# Patient Record
Sex: FEMALE | Race: WHITE | NOT HISPANIC OR LATINO | Employment: UNEMPLOYED | URBAN - NONMETROPOLITAN AREA
[De-identification: names, ages, dates, MRNs, and addresses within clinical notes are randomized per-mention and may not be internally consistent; named-entity substitution may affect disease eponyms.]

---
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
## 2019-01-02 PROBLEM — O99.333 PREGNANCY COMPLICATED BY TOBACCO USE IN THIRD TRIMESTER: Status: ACTIVE | Noted: 2019-01-02

## 2019-01-02 PROBLEM — O09.33 PRENATAL CARE INSUFFICIENT, THIRD TRIMESTER: Status: ACTIVE | Noted: 2019-01-02

## 2019-01-02 NOTE — PROGRESS NOTES
CC: NELIDA visit    HPI: No c/o.  Getting Hgb A1C drawn today since has been unable to do 1hr glucose+FM  Denies any VB or cramping     PE - see vitals  Anatomy scan performed today of female fetus.  Anterior placenta,  3VC noted, ELMER 15.4, CL 4.63cm     A/P: 19 yo  @ 30w1d by 23w luis presents for NELIDA visit.   1. Continue routine prenatal care  - Encourage PNV  - PTL warnings reviewed  - B+, RI, HIV neg, RPR NR, HBsAg neg, GC/CT neg/neg;   - RTC in 2 weeks NELIDA with MD    2. Late Prenatal Care  - IG US Q4w (42% today)    3. Tobacco use    Karuna MENA CNM

## 2019-01-04 NOTE — PROGRESS NOTES
CC: NELIDA visit    HPI: No c/o.  +FM  Denies any VB or cramping     PE - see vitals    A/P: 17 yo  @ 32w2d by 23w sonchristina presents for NELIDA visit.   1. Continue routine prenatal care  - Encourage PNV  - PTL warnings reviewed  - B+, RI, HIV neg, RPR NR, HBsAg neg, GC/CT neg/neg; HgbAIC wnl  - RTC in 2 weeks NELIDA with MD and growth scan    2. Late Prenatal Care  - IG US Q4w (42% today)    3. Tobacco use    4. Anemia   - Ferrous sulfate TID    Karuna MENA CNM

## 2019-01-07 ENCOUNTER — ROUTINE PRENATAL (OUTPATIENT)
Dept: OBSTETRICS AND GYNECOLOGY | Facility: CLINIC | Age: 19
End: 2019-01-07

## 2019-01-07 VITALS — DIASTOLIC BLOOD PRESSURE: 74 MMHG | BODY MASS INDEX: 23.23 KG/M2 | WEIGHT: 127 LBS | SYSTOLIC BLOOD PRESSURE: 107 MMHG

## 2019-01-07 DIAGNOSIS — Z3A.34 34 WEEKS GESTATION OF PREGNANCY: Primary | ICD-10-CM

## 2019-01-07 DIAGNOSIS — O09.33 PRENATAL CARE INSUFFICIENT, THIRD TRIMESTER: ICD-10-CM

## 2019-01-07 PROCEDURE — 99213 OFFICE O/P EST LOW 20 MIN: CPT | Performed by: NURSE PRACTITIONER

## 2019-01-07 RX ORDER — OMEPRAZOLE 20 MG/1
20 CAPSULE, DELAYED RELEASE ORAL DAILY
Qty: 30 CAPSULE | Refills: 1 | Status: SHIPPED | OUTPATIENT
Start: 2019-01-07 | End: 2019-12-14

## 2019-01-07 RX ORDER — PRENATAL VIT/IRON FUM/FOLIC AC 65 MG-1 MG
1 TABLET ORAL DAILY
Qty: 30 EACH | Refills: 12 | Status: SHIPPED | OUTPATIENT
Start: 2019-01-07 | End: 2019-12-14

## 2019-01-07 NOTE — PROGRESS NOTES
CC: NELIDA visit    HPI: No c/o.  +FM  Denies any VB or cramping     PE - see vitals  Growth US 33% 5lb 4oz     A/P: 17 yo  @ 34w6d by 23w luis presents for NELIDA visit.   1. Continue routine prenatal care  - Encourage PNV  - PTL warnings reviewed  - B+, RI, HIV neg, RPR NR, HBsAg neg, GC/CT neg/neg;   - RTC in 2 weeks NELIDA with MD    2. Late Prenatal Care  - IG US Q4w     3. Tobacco use    Karuna MENA CNM

## 2019-01-15 ENCOUNTER — ROUTINE PRENATAL (OUTPATIENT)
Dept: OBSTETRICS AND GYNECOLOGY | Facility: CLINIC | Age: 19
End: 2019-01-15

## 2019-01-15 VITALS — DIASTOLIC BLOOD PRESSURE: 73 MMHG | BODY MASS INDEX: 23.59 KG/M2 | SYSTOLIC BLOOD PRESSURE: 106 MMHG | WEIGHT: 129 LBS

## 2019-01-15 DIAGNOSIS — O09.33 PRENATAL CARE INSUFFICIENT, THIRD TRIMESTER: ICD-10-CM

## 2019-01-15 DIAGNOSIS — O99.333 PREGNANCY COMPLICATED BY TOBACCO USE IN THIRD TRIMESTER: ICD-10-CM

## 2019-01-15 DIAGNOSIS — Z3A.36 36 WEEKS GESTATION OF PREGNANCY: ICD-10-CM

## 2019-01-15 DIAGNOSIS — Z36.85 ANTENATAL SCREENING FOR STREPTOCOCCUS B: Primary | ICD-10-CM

## 2019-01-15 PROCEDURE — 99213 OFFICE O/P EST LOW 20 MIN: CPT | Performed by: NURSE PRACTITIONER

## 2019-01-15 PROCEDURE — 87653 STREP B DNA AMP PROBE: CPT | Performed by: NURSE PRACTITIONER

## 2019-01-15 RX ORDER — HYDROXYZINE PAMOATE 25 MG/1
25 CAPSULE ORAL 3 TIMES DAILY PRN
Qty: 30 CAPSULE | Refills: 0 | Status: SHIPPED | OUTPATIENT
Start: 2019-01-15 | End: 2019-12-14

## 2019-01-15 NOTE — PROGRESS NOTES
CC: NELIDA visit     HPI: Contractions every 20-30 for last 2 days.  +FM  Denies any VB or cramping     PE - see vitals  Growth US 33% 5lb 4oz      A/P: 19 yo  @ 36w0d by 23w luis presents for NELIDA visit.   1. Continue routine prenatal care  - Encourage PNV  - PTL warnings reviewed  - B+, RI, HIV neg, RPR NR, HBsAg neg, GC/CT neg/neg; GBS  - RTC in 1 weeks NELIDA      2. Late Prenatal Care  - IG US Q4w      3. Tobacco use     Karuna MENA CNM

## 2019-01-16 LAB — GROUP B STREP, DNA: NEGATIVE

## 2019-01-18 ENCOUNTER — HOSPITAL ENCOUNTER (OUTPATIENT)
Facility: HOSPITAL | Age: 19
Discharge: HOME | End: 2019-01-18
Attending: OBSTETRICS & GYNECOLOGY | Admitting: OBSTETRICS & GYNECOLOGY

## 2019-01-18 VITALS
HEART RATE: 114 BPM | RESPIRATION RATE: 18 BRPM | SYSTOLIC BLOOD PRESSURE: 134 MMHG | OXYGEN SATURATION: 98 % | TEMPERATURE: 98.4 F | DIASTOLIC BLOOD PRESSURE: 74 MMHG

## 2019-01-18 LAB — A1 MICROGLOB PLACENTAL VAG QL: NEGATIVE

## 2019-01-18 PROCEDURE — 59025 FETAL NON-STRESS TEST: CPT

## 2019-01-18 PROCEDURE — G0463 HOSPITAL OUTPT CLINIC VISIT: HCPCS

## 2019-01-18 PROCEDURE — 25010000003 HYDROXYZINE PER 25 MG: Performed by: OBSTETRICS & GYNECOLOGY

## 2019-01-18 PROCEDURE — 84112 EVAL AMNIOTIC FLUID PROTEIN: CPT | Performed by: OBSTETRICS & GYNECOLOGY

## 2019-01-18 PROCEDURE — 96372 THER/PROPH/DIAG INJ SC/IM: CPT

## 2019-01-18 RX ORDER — HYDROXYZINE HYDROCHLORIDE 25 MG/ML
100 INJECTION, SOLUTION INTRAMUSCULAR ONCE
Status: COMPLETED | OUTPATIENT
Start: 2019-01-18 | End: 2019-01-18

## 2019-01-18 RX ADMIN — HYDROXYZINE HYDROCHLORIDE 100 MG: 25 INJECTION, SOLUTION INTRAMUSCULAR at 07:29

## 2019-01-18 NOTE — NURSING NOTE
RN at bedside with discharge paperwork. Pt given clinical references on [place] [facility] Contractions and Fetal Kick Counts. Discharge instructions reviewed with pt as given by Dr. Tinoco. RN verified that the patients friend, whom is at bedside, would drive pt home d/t the IM Vistaril.  Pt instructed not to drive herself. Pt and friend verbalized understanding and agreement. Pt denies questions.     0825 pt ambulated to vehicle with her friend for discharge.

## 2019-01-18 NOTE — NURSING NOTE
"Dr. Tinoco notified by phone of pt request to be discharged. Pt states that contractions are still present and she is aware of them but they  \"don't hurt like before\". Pt has stated 3 times since medication given \"I just want to go home, when am I going to be able to\". Pt VAS 1 35 minutes after IM Vistaril given.     Order to discharge pt home. Pt must have transportation, she is not allowed to drive herself d/t the Vistaril. Give pt instructions to return to unit if she has bloody show regardless if strong contractions are present or not. Give pt other routine instructions for returning to unit.   "

## 2019-01-18 NOTE — NON STRESS TEST
Shanika Hudson, a  at 36w3d with an HORTENCIA of 2019, by Ultrasound, was seen at [facility]OR DELIVERY for a nonstress test.    Chief Complaint   Patient presents with   • Contractions     Contractions since tuesday, worse today   • Leaking Fluid     feels like her water is leaking       Patient Active Problem List   Diagnosis   • Pregnancy complicated by tobacco use in third trimester   • Prenatal care insufficient, third trimester       Start Time: 0400  Stop Time: 07    Interpretation A  Nonstress Test Interpretation A: Reactive (19 07 : Millie Thomas, RN)  Comments A: reviewed w/ Kristina RN (19 : Millie Thomas, RN)    Amniswab negative. Ferning negative. Amnisure negative. No fluid noted on exam. sve 2/50/0. No bloody show noted. uc's remain irregular. IM VIstaril 100mg given w/ some relief. Pt request discharge.

## 2019-01-18 NOTE — DISCHARGE INSTRUCTIONS
Please return for contractions that are regular and increasing in intensity, bloody show with or without strong contractions, leaking of fluid, decreased fetal movement, bright red vaginal bleeding or any other concerns. May call on call provider or office during office hours for any questions or concerns on whether to return to unit.

## 2019-01-22 ENCOUNTER — ROUTINE PRENATAL (OUTPATIENT)
Dept: OBSTETRICS AND GYNECOLOGY | Facility: CLINIC | Age: 19
End: 2019-01-22

## 2019-01-22 VITALS — WEIGHT: 130 LBS | SYSTOLIC BLOOD PRESSURE: 107 MMHG | DIASTOLIC BLOOD PRESSURE: 75 MMHG | BODY MASS INDEX: 23.78 KG/M2

## 2019-01-22 DIAGNOSIS — O09.33 PRENATAL CARE INSUFFICIENT, THIRD TRIMESTER: Primary | ICD-10-CM

## 2019-01-22 DIAGNOSIS — O99.333 TOBACCO USE IN PREGNANCY, ANTEPARTUM, THIRD TRIMESTER: ICD-10-CM

## 2019-01-22 DIAGNOSIS — Z3A.37 37 WEEKS GESTATION OF PREGNANCY: ICD-10-CM

## 2019-01-22 PROCEDURE — 99213 OFFICE O/P EST LOW 20 MIN: CPT | Performed by: NURSE PRACTITIONER

## 2019-01-22 NOTE — PROGRESS NOTES
CC: NELIDA visit     HPI: Very uncomfortable.  Went to [facility] over the weekend with contractions 4min apart.  Has continued to contract since just not as strong or close together.   +FM  Denies any VB or LOF     PE - see vitals    A/P: 17 yo  @ 37w0d by 23w luis presents for NELIDA visit.   1. Continue routine prenatal care  - Encourage PNV  - Labor s/s reviewed  - B+, RI, HIV neg, RPR NR, HBsAg neg, GC/CT neg/neg; GBS  - RTC in 2 weeks NELIDA with MD     2. Late Prenatal Care  - IG US Q4w      3. Tobacco use     Karuna MENA CNM

## 2019-02-04 ENCOUNTER — HOSPITAL ENCOUNTER (OUTPATIENT)
Facility: HOSPITAL | Age: 19
Discharge: HOME | End: 2019-02-04
Attending: OBSTETRICS & GYNECOLOGY | Admitting: OBSTETRICS & GYNECOLOGY

## 2019-02-04 VITALS
TEMPERATURE: 98 F | BODY MASS INDEX: 23.04 KG/M2 | OXYGEN SATURATION: 97 % | SYSTOLIC BLOOD PRESSURE: 109 MMHG | HEART RATE: 110 BPM | HEIGHT: 63 IN | RESPIRATION RATE: 18 BRPM | DIASTOLIC BLOOD PRESSURE: 60 MMHG | WEIGHT: 130 LBS

## 2019-02-04 LAB
A1 MICROGLOB PLACENTAL VAG QL: NEGATIVE
CANDIDA ALBICANS: NEGATIVE
GARDNERELLA VAGINALIS: NEGATIVE
TRICHOMONAS VAGINALIS PCR: NEGATIVE

## 2019-02-04 PROCEDURE — 87510 GARDNER VAG DNA DIR PROBE: CPT | Performed by: OBSTETRICS & GYNECOLOGY

## 2019-02-04 PROCEDURE — 87480 CANDIDA DNA DIR PROBE: CPT | Performed by: OBSTETRICS & GYNECOLOGY

## 2019-02-04 PROCEDURE — G0463 HOSPITAL OUTPT CLINIC VISIT: HCPCS

## 2019-02-04 PROCEDURE — 87660 TRICHOMONAS VAGIN DIR PROBE: CPT | Performed by: OBSTETRICS & GYNECOLOGY

## 2019-02-04 PROCEDURE — 84112 EVAL AMNIOTIC FLUID PROTEIN: CPT | Performed by: OBSTETRICS & GYNECOLOGY

## 2019-02-04 PROCEDURE — 59025 FETAL NON-STRESS TEST: CPT

## 2019-02-05 ENCOUNTER — ROUTINE PRENATAL (OUTPATIENT)
Dept: OBSTETRICS AND GYNECOLOGY | Facility: CLINIC | Age: 19
End: 2019-02-05

## 2019-02-05 DIAGNOSIS — O99.333 TOBACCO SMOKING AFFECTING PREGNANCY IN THIRD TRIMESTER: ICD-10-CM

## 2019-02-05 DIAGNOSIS — O26.893 LOW BACK PAIN DURING PREGNANCY IN THIRD TRIMESTER: ICD-10-CM

## 2019-02-05 DIAGNOSIS — Z34.90 TERM PREGNANCY: Primary | ICD-10-CM

## 2019-02-05 DIAGNOSIS — Z3A.39 39 WEEKS GESTATION OF PREGNANCY: ICD-10-CM

## 2019-02-05 DIAGNOSIS — M54.50 LOW BACK PAIN DURING PREGNANCY IN THIRD TRIMESTER: ICD-10-CM

## 2019-02-05 PROCEDURE — 99213 OFFICE O/P EST LOW 20 MIN: CPT | Performed by: NURSE PRACTITIONER

## 2019-02-05 NOTE — NON STRESS TEST
"  Shanika Hudson, a  at 38w6d with an HORTENCIA of 2019, by Ultrasound, was seen at [facility] LABOR DELIVERY for a nonstress test.    Chief Complaint   Patient presents with   • Contractions     Pt to L&D with c/o contractions that have been going on for \"a long time\". Pt states they don't feel any different than they have before. Pt also reports having cramping that started 2 days ago, but states she has not spoken to anyone in the office about it. Pt reports + fetal movement. Pt reports leaking fluid \"for a while\". Pt denies any bleeding.        Patient Active Problem List   Diagnosis   • Pregnancy complicated by tobacco use in third trimester   • Prenatal care insufficient, third trimester       Start Time:   Stop Time:     Interpretation A  Nonstress Test Interpretation A: Reactive (19 2350 : Lexi Gutiérrez RN)  Comments A: Reviewed with ALEXANDRO Crain RN (19 2560 : Lexi Gutiérrez RN)      Vag panel negative  Amnisure negative    [facility]   "

## 2019-02-05 NOTE — NURSING NOTE
At bedside to go over negative lab results with pt. Told pt I would do cervical exam again to make sure she hadn't dilated anymore. Pt refused. Pt stated she has a dr appointment in the morning and she feels like she probably hasn't changed anyway.

## 2019-02-05 NOTE — DISCHARGE INSTRUCTIONS
Return to hospital for regular painful contractions that you are unable to walk or talk through, bleeding, decreased fetal movement, or water breaks.     Keep next scheduled appointment.     Call office or on call OB with any questions or concerns.

## 2019-02-12 ENCOUNTER — ROUTINE PRENATAL (OUTPATIENT)
Dept: OBSTETRICS AND GYNECOLOGY | Facility: CLINIC | Age: 19
End: 2019-02-12

## 2019-02-12 VITALS — SYSTOLIC BLOOD PRESSURE: 100 MMHG | WEIGHT: 134.8 LBS | BODY MASS INDEX: 23.88 KG/M2 | DIASTOLIC BLOOD PRESSURE: 60 MMHG

## 2019-02-12 DIAGNOSIS — Z3A.40 40 WEEKS GESTATION OF PREGNANCY: Primary | ICD-10-CM

## 2019-02-12 DIAGNOSIS — O99.333 PREGNANCY COMPLICATED BY TOBACCO USE IN THIRD TRIMESTER: ICD-10-CM

## 2019-02-12 DIAGNOSIS — O09.33 PRENATAL CARE INSUFFICIENT, THIRD TRIMESTER: ICD-10-CM

## 2019-02-12 PROCEDURE — 99213 OFFICE O/P EST LOW 20 MIN: CPT | Performed by: OBSTETRICS & GYNECOLOGY

## 2019-02-13 DIAGNOSIS — Z34.90 TERM PREGNANCY: ICD-10-CM

## 2019-02-13 RX ORDER — SODIUM CHLORIDE, SODIUM LACTATE, POTASSIUM CHLORIDE, CALCIUM CHLORIDE 600; 310; 30; 20 MG/100ML; MG/100ML; MG/100ML; MG/100ML
125 INJECTION, SOLUTION INTRAVENOUS CONTINUOUS
Status: CANCELLED | OUTPATIENT
Start: 2019-02-13

## 2019-02-13 RX ORDER — SODIUM CHLORIDE 0.9 % (FLUSH) 0.9 %
3-10 SYRINGE (ML) INJECTION AS NEEDED
Status: CANCELLED | OUTPATIENT
Start: 2019-02-13

## 2019-02-13 RX ORDER — CARBOPROST TROMETHAMINE 250 UG/ML
250 INJECTION, SOLUTION INTRAMUSCULAR AS NEEDED
Status: CANCELLED | OUTPATIENT
Start: 2019-02-13

## 2019-02-13 RX ORDER — SODIUM CHLORIDE 0.9 % (FLUSH) 0.9 %
3 SYRINGE (ML) INJECTION EVERY 12 HOURS SCHEDULED
Status: CANCELLED | OUTPATIENT
Start: 2019-02-13

## 2019-02-13 RX ORDER — METHYLERGONOVINE MALEATE 0.2 MG/ML
200 INJECTION INTRAVENOUS ONCE AS NEEDED
Status: CANCELLED | OUTPATIENT
Start: 2019-02-13

## 2019-02-13 RX ORDER — MISOPROSTOL 100 UG/1
800 TABLET ORAL AS NEEDED
Status: CANCELLED | OUTPATIENT
Start: 2019-02-13

## 2019-02-13 RX ORDER — LIDOCAINE HYDROCHLORIDE 10 MG/ML
5 INJECTION, SOLUTION EPIDURAL; INFILTRATION; INTRACAUDAL; PERINEURAL AS NEEDED
Status: CANCELLED | OUTPATIENT
Start: 2019-02-13

## 2019-02-13 RX ORDER — OXYTOCIN/0.9 % SODIUM CHLORIDE 30/500 ML
2-30 PLASTIC BAG, INJECTION (ML) INTRAVENOUS
Status: CANCELLED | OUTPATIENT
Start: 2019-02-13

## 2019-02-13 NOTE — PROGRESS NOTES
Chief Complaint   Patient presents with   • OB Follow up     40w0d   • High Risk Gestation     Shanika Hudson is a 18 y.o. year old .      - B+, RI, HIV neg, RPR NR, HBsAg neg, GC/CT neg/neg; GBS  - RTC in 2 weeks NELIDA with MD     2. Late Prenatal Care  - IG US Q4w      3. Tobacco use    January 15, 2019 GBS negative.    She is having a girl named Blue.    2019, ultrasound shows single intrauterine pregnancy in the cephalic position with fetal heart rate 132 bpm.  ELMER normal at 18.82 cm.  Biophysical profile 8 out of 8.  Estimated fetal weight 8 pounds 3 ounces or 56 percentile.  No abnormality seen.  Cervix 3/50%/-3.  Patient desires labor augmentation Thursday, 2019 at 0400.    Obstetric History  #: 1, Date: , Sex: Male, Weight: 3062 g (6 lb 12 oz), GA: 40w0d, Delivery: Vaginal, Spontaneous, Apgar1: None, Apgar5: None, Living: Living, Birth Comments: None    #: 2, Date: 10/05/17, Sex: Female, Weight: 3402 g (7 lb 8 oz), GA: 40w0d, Delivery: Vaginal, Spontaneous, Apgar1: 8, Apgar5: 9, Living: Living, Birth Comments: None    #: 3, Date: None, Sex: None, Weight: None, GA: None, Delivery: None, Apgar1: None, Apgar5: None, Living: None, Birth Comments: None      The patient complains of the following: No new complaints    ROS  Headache: No   Visual changes: No   Swelling in legs: No   Nausea: No   Constipation: No   Diarrhea: No   Contractions: YES   Leaking fluid: No   Vaginal bleeding: No   Other:      Specific topics discussed at today's visit: fetal kick counts and labor precautions  Tests done today: [facility] -       Shanika was seen today for ob follow up and high risk gestation.    Diagnoses and all orders for this visit:    40 weeks gestation of pregnancy    Prenatal care insufficient, third trimester    Pregnancy complicated by tobacco use in third trimester

## 2019-02-13 NOTE — H&P (VIEW-ONLY)
Obstetric History and Physical    Chief Complaint   Patient presents with   • OB Follow up     40w0d   • High Risk Gestation     Shanika Hudson is a 18 y.o. year old .      - B+, RI, HIV neg, RPR NR, HBsAg neg, GC/CT neg/neg; GBS  - RTC in 2 weeks NELIDA with MD     2. Late Prenatal Care  - IG US Q4w      3. Tobacco use    January 15, 2019 GBS negative.    She is having a girl named Blue.    2019, ultrasound shows single intrauterine pregnancy in the cephalic position with fetal heart rate 132 bpm.  ELMER normal at 18.82 cm.  Biophysical profile 8 out of 8.  Estimated fetal weight 8 pounds 3 ounces or 56 percentile.  No abnormality seen.  Cervix 3/50%/-3.  Patient desires labor augmentation Thursday, 2019 at 0400.     Obstetric History   #: 1, Date: , Sex: Male, Weight: 3062 g (6 lb 12 oz), GA: 40w0d, Delivery: Vaginal, Spontaneous, Apgar1: None, Apgar5: None, Living: Living, Birth Comments: None    #: 2, Date: 10/05/17, Sex: Female, Weight: 3402 g (7 lb 8 oz), GA: 40w0d, Delivery: Vaginal, Spontaneous, Apgar1: 8, Apgar5: 9, Living: Living, Birth Comments: None    #: 3, Date: None, Sex: None, Weight: None, GA: None, Delivery: None, Apgar1: None, Apgar5: None, Living: None, Birth Comments: None      The following portions of the patients history were reviewed and updated as appropriate: current medications, allergies, past medical history, past surgical history, past family history, past social history and problem list .       Prenatal Information:  Prenatal Results     Initial Prenatal Labs     Test Value Reference Range Date Time    Hemoglobin 10.4 g/dL 12.0 - 15.5 g/dL 10/03/18 1412    Hematocrit 30.9 % 35.0 - 45.0 % 10/03/18 1412    Platelets 242 10*3/mm3 150 - 450 10*3/mm3 10/03/18 1412    Rubella IgG 37.2 IU/mL 0.0 - 9.9 IU/mL 10/03/18 1412      Immune  Immune 10/03/18 1412    Hepatitis B SAg Negative  Negative 10/03/18 1412    Hepatitis C Ab Negative  Negative  10/03/18 1412    RPR Non-Reactive  Non-Reactive 10/03/18 1412    ABO B   10/03/18 1412    Rh Positive   10/03/18 1412    Antibody Screen Negative   10/03/18 1412    HIV Negative  Negative 10/03/18 1412    Urine Culture No growth at 24 hours   12/20/18 1012    Gonorrhea Negative  Negative 12/20/18 1012    Chlamydia Negative  Negative 12/20/18 1012    TSH 1.60 uIU/ml 0.46 - 4.68 uIU/ml 03/12/15 1103          2nd and 3rd Trimester     Test Value Reference Range Date Time    Hemoglobin (repeated)        Hematocrit (repeated)        GCT        Antibody Screen (repeated)        GTT Fasting        GTT 1 Hr        GTT 2 Hr        GTT 3 Hr        Group B Strep Negative  Negative 01/15/19 0816          Drug Screening     Test Value Reference Range Date Time    Amphetamine Screen Negative  Negative 12/20/18 1012    Barbiturate Screen Negative  Negative 12/20/18 1012    Benzodiazepine Screen Negative  Negative 12/20/18 1012    Methadone Screen Negative  Negative 12/20/18 1012    Phencyclidine Screen        Opiates Screen Negative  Negative 12/20/18 1012    THC Screen Negative  Negative 12/20/18 1012    Cocaine Screen Negative  Negative 12/20/18 1012    Propoxyphene Screen        Buprenorphine Screen        Methamphetamine Screen        Oxycodone Screen Negative  Negative 12/20/18 1012    Tricyclic Antidepressants Screen              Other (Risk screening)     Test Value Reference Range Date Time    Varicella IgG        Parvovirus IgG        CMV IgG        Cystic Fibrosis        Hemoglobin electrophoresis        NIPT        MSAFP-4        AFP (for NTD only)                  External Prenatal Results     Pregnancy Outside Results - Transcribed From Office Records - See Scanned Records For Details     Test Value Date Time    Hgb 10.4 g/dL 10/03/18 1412    Hct 30.9 % 10/03/18 1412    ABO B  10/03/18 1412    Rh Positive  10/03/18 1412    Antibody Screen Negative  10/03/18 1412    Glucose Fasting GTT       Glucose Tolerance Test 1  hour       Glucose Tolerance Test 3 hour       Gonorrhea (discrete) Negative  18 1012    Chlamydia (discrete) Negative  18 1012    RPR Non-Reactive  10/03/18 1412    VDRL       Syphilis Antibody       Rubella 37.2 IU/mL 10/03/18 1412      Immune  10/03/18 1412    HBsAg Negative  10/03/18 1412    Herpes Simplex Virus PCR       Herpes Simplex VIrus Culture       HIV Negative  10/03/18 1412    Hep C RNA Quant PCR       Hep C Antibody Negative  10/03/18 1412    AFP       Group B Strep Negative  01/15/19 0816    GBS Susceptibility to Clindamycin       GBS Susceptibility to Erythromycin       Fetal Fibronectin       Genetic Testing, Maternal Blood             Drug Screening     Test Value Date Time    Urine Drug Screen       Amphetamine Screen Negative  18 1012    Barbiturate Screen Negative  18 1012    Benzodiazepine Screen Negative  18 1012    Methadone Screen Negative  18 1012    Phencyclidine Screen       Opiates Screen Negative  18 1012    THC Screen Negative  18 1012    Cocaine Screen       Propoxyphene Screen       Buprenorphine Screen       Methamphetamine Screen       Oxycodone Screen Negative  18 1012    Tricyclic Antidepressants Screen                    Past OB History:     Obstetric History       T2      L2     SAB0   TAB0   Ectopic0   Molar0   Multiple0   Live Births2       # Outcome Date GA Lbr Ta/2nd Weight Sex Delivery Anes PTL Lv   3 Current            2 Term 10/05/17 40w0d 19:12 / 00:15 3402 g (7 lb 8 oz) F Vag-Spont EPI N JUAN RAMON      Name: DENNY MIRANDA      Apgar1:  8                Apgar5: 9   1 Term  40w0d  3062 g (6 lb 12 oz) M Vag-Spont   JUAN RAMON           ALLERGIES:   No Known Allergies     Home Medications:     Prior to Admission medications    Medication Sig Start Date End Date Taking? Authorizing Provider   hydrOXYzine (VISTARIL) 25 MG capsule Take 1 capsule by mouth 3 (Three) Times a Day As Needed for Itching. 1/15/19   Yes Karuna Jay CNM   omeprazole (PRILOSEC) 20 MG capsule Take 1 capsule by mouth Daily. 1/7/19 1/7/20 Yes Karuna Jay CNM   Prenatal Vit-Fe Fumarate-FA (MYNATAL PLUS) tablet Take 1 tablet by mouth Daily. 1/7/19  Yes Karuna Jay CNM       Past Medical History: Past Medical History:   Diagnosis Date   • Acute pharyngitis    • Asthma    • Encounter for initial prescription of contraceptive pills    • Fever    • GERD (gastroesophageal reflux disease)    • Herpes labialis    • History of MRSA infection    • Nausea       Past Surgical History Past Surgical History:   Procedure Laterality Date   • ADENOIDECTOMY     • TONSILLECTOMY     • TONSILLECTOMY AND ADENOIDECTOMY     • TYMPANOSTOMY        Family History: Family History   Problem Relation Age of Onset   • Lung cancer Paternal Grandfather    • Breast cancer Neg Hx    • Uterine cancer Neg Hx    • Ovarian cancer Neg Hx    • Colon cancer Neg Hx       Social History:  reports that she has been smoking cigarettes.  She has been smoking about 1.00 pack per day. she has never used smokeless tobacco.   reports that she does not drink alcohol.   reports that she does not use drugs.     Review of Systems   Constitutional: Negative for activity change, appetite change, chills, diaphoresis, fatigue, fever and unexpected weight change.   Gastrointestinal: Negative for abdominal pain, constipation, diarrhea and nausea.   Genitourinary: Negative for difficulty urinating, dyspareunia, dysuria, menstrual problem, pelvic pain, urgency, vaginal bleeding, vaginal discharge and vaginal pain.   Neurological: Negative for headaches.   Psychiatric/Behavioral: Negative for dysphoric mood. The patient is not nervous/anxious.    All other systems reviewed and are negative.    Objective       Vital Signs Range for the last 24 hours  Temperature:     Temp Source:     BP: BP: (100)/(60) 100/60   Pulse:     Respirations:     SPO2:     O2 Amount (l/min):     O2 Devices      Weight: Weight:  [61.1 kg (134 lb 12.8 oz)] 61.1 kg (134 lb 12.8 oz)       OBGyn Exam  Physical Examination: General appearance - alert, well appearing, and in no distress and oriented to person, place, and time  Mental status - alert, oriented to person, place, and time, normal mood, behavior, speech, dress, motor activity, and thought processes  Neck - supple, no significant adenopathy  Chest - clear to auscultation, no wheezes, rales or rhonchi, symmetric air entry, no tachypnea, retractions or cyanosis  Heart - normal rate, regular rhythm, normal S1, S2, no murmurs, rubs, clicks or gallops  Abdomen - Gravid and nontender  Extremities - peripheral pulses normal, no pedal edema, no clubbing or cyanosis                  Assessment:  1. Intrauterine pregnancy at 40 weeks and 2 days February 14, 2019 with favorable cervix desiring labor augmentation.    GBS status: Results in Past 30 Days  Result Component Current Result Ref Range Previous Result Ref Range   Group B Strep, DNA Negative (1/15/2019) Negative Not in Time Range        Plan:  1. Admit to labor and delivery on Thursday, February 14, 2019 0400 labor augmentation.  2. Plan of care has been reviewed with staff, patient, and patient's family.  3. Risks, benefits of treatment plan have been discussed.  4. All questions have been answered.      Adam James MD  2/13/2019  12:49 AM

## 2019-02-14 ENCOUNTER — HOSPITAL ENCOUNTER (INPATIENT)
Dept: LABOR AND DELIVERY | Facility: HOSPITAL | Age: 19
Discharge: HOME | End: 2019-02-14

## 2019-02-14 ENCOUNTER — ANESTHESIA EVENT (OUTPATIENT)
Dept: LABOR AND DELIVERY | Facility: HOSPITAL | Age: 19
End: 2019-02-14

## 2019-02-14 ENCOUNTER — ANESTHESIA (OUTPATIENT)
Dept: LABOR AND DELIVERY | Facility: HOSPITAL | Age: 19
End: 2019-02-14

## 2019-02-14 ENCOUNTER — HOSPITAL ENCOUNTER (INPATIENT)
Facility: HOSPITAL | Age: 19
LOS: 1 days | Discharge: HOME | End: 2019-02-15
Attending: OBSTETRICS & GYNECOLOGY | Admitting: OBSTETRICS & GYNECOLOGY

## 2019-02-14 DIAGNOSIS — Z3A.40 40 WEEKS GESTATION OF PREGNANCY: ICD-10-CM

## 2019-02-14 LAB
ABO GROUP BLD: NORMAL
AMPHET+METHAMPHET UR QL: NEGATIVE
BARBITURATES UR QL SCN: NEGATIVE
BENZODIAZ UR QL SCN: NEGATIVE
BLD GP AB SCN SERPL QL: NEGATIVE
CANNABINOIDS SERPL QL: NEGATIVE
COCAINE UR QL: NEGATIVE
DEPRECATED RDW RBC AUTO: 48.2 FL (ref 37–54)
ERYTHROCYTE [DISTWIDTH] IN BLOOD BY AUTOMATED COUNT: 15.7 % (ref 12.3–15.4)
HCT VFR BLD AUTO: 28 % (ref 34–46.6)
HGB BLD-MCNC: 9.4 G/DL (ref 12–15.9)
HOLD SPECIMEN: NORMAL
HOLD SPECIMEN: NORMAL
Lab: NORMAL
MCH RBC QN AUTO: 28.1 PG (ref 26.6–33)
MCHC RBC AUTO-ENTMCNC: 33.6 G/DL (ref 31.5–35.7)
MCV RBC AUTO: 83.8 FL (ref 79–97)
METHADONE UR QL SCN: NEGATIVE
OPIATES UR QL: NEGATIVE
OXYCODONE UR QL SCN: NEGATIVE
PLATELET # BLD AUTO: 280 10*3/MM3 (ref 140–450)
PMV BLD AUTO: 10.2 FL (ref 6–12)
RBC # BLD AUTO: 3.34 10*6/MM3 (ref 3.77–5.28)
RH BLD: POSITIVE
T&S EXPIRATION DATE: NORMAL
WBC NRBC COR # BLD: 10.03 10*3/MM3 (ref 3.4–10.8)

## 2019-02-14 PROCEDURE — 80307 DRUG TEST PRSMV CHEM ANLYZR: CPT | Performed by: OBSTETRICS & GYNECOLOGY

## 2019-02-14 PROCEDURE — 51703 INSERT BLADDER CATH COMPLEX: CPT

## 2019-02-14 PROCEDURE — 25010000002 FENTANYL CITRATE (PF) 100 MCG/2ML SOLUTION: Performed by: NURSE ANESTHETIST, CERTIFIED REGISTERED

## 2019-02-14 PROCEDURE — 85027 COMPLETE CBC AUTOMATED: CPT | Performed by: OBSTETRICS & GYNECOLOGY

## 2019-02-14 PROCEDURE — 86900 BLOOD TYPING SEROLOGIC ABO: CPT | Performed by: OBSTETRICS & GYNECOLOGY

## 2019-02-14 PROCEDURE — 86850 RBC ANTIBODY SCREEN: CPT | Performed by: OBSTETRICS & GYNECOLOGY

## 2019-02-14 PROCEDURE — 10907ZC DRAINAGE OF AMNIOTIC FLUID, THERAPEUTIC FROM PRODUCTS OF CONCEPTION, VIA NATURAL OR ARTIFICIAL OPENING: ICD-10-PCS | Performed by: OBSTETRICS & GYNECOLOGY

## 2019-02-14 PROCEDURE — 25010000002 KETOROLAC TROMETHAMINE PER 15 MG: Performed by: OBSTETRICS & GYNECOLOGY

## 2019-02-14 PROCEDURE — 86901 BLOOD TYPING SEROLOGIC RH(D): CPT | Performed by: OBSTETRICS & GYNECOLOGY

## 2019-02-14 PROCEDURE — C1755 CATHETER, INTRASPINAL: HCPCS

## 2019-02-14 PROCEDURE — 59410 OBSTETRICAL CARE: CPT | Performed by: OBSTETRICS & GYNECOLOGY

## 2019-02-14 PROCEDURE — C1755 CATHETER, INTRASPINAL: HCPCS | Performed by: NURSE ANESTHETIST, CERTIFIED REGISTERED

## 2019-02-14 RX ORDER — BISACODYL 10 MG
10 SUPPOSITORY, RECTAL RECTAL DAILY PRN
Status: DISCONTINUED | OUTPATIENT
Start: 2019-02-15 | End: 2019-02-15 | Stop reason: HOSPADM

## 2019-02-14 RX ORDER — OXYTOCIN/RINGER'S LACTATE 20/1000 ML
PLASTIC BAG, INJECTION (ML) INTRAVENOUS
Status: DISCONTINUED
Start: 2019-02-14 | End: 2019-02-14 | Stop reason: WASHOUT

## 2019-02-14 RX ORDER — SODIUM CHLORIDE, SODIUM LACTATE, POTASSIUM CHLORIDE, CALCIUM CHLORIDE 600; 310; 30; 20 MG/100ML; MG/100ML; MG/100ML; MG/100ML
INJECTION, SOLUTION INTRAVENOUS
Status: COMPLETED
Start: 2019-02-14 | End: 2019-02-14

## 2019-02-14 RX ORDER — ONDANSETRON 4 MG/1
4 TABLET, FILM COATED ORAL EVERY 8 HOURS PRN
Status: DISCONTINUED | OUTPATIENT
Start: 2019-02-14 | End: 2019-02-15 | Stop reason: HOSPADM

## 2019-02-14 RX ORDER — FAMOTIDINE 10 MG/ML
20 INJECTION, SOLUTION INTRAVENOUS ONCE AS NEEDED
Status: DISCONTINUED | OUTPATIENT
Start: 2019-02-14 | End: 2019-02-14 | Stop reason: HOSPADM

## 2019-02-14 RX ORDER — HYDROCODONE BITARTRATE AND ACETAMINOPHEN 5; 325 MG/1; MG/1
1 TABLET ORAL EVERY 6 HOURS PRN
Status: DISCONTINUED | OUTPATIENT
Start: 2019-02-14 | End: 2019-02-15 | Stop reason: HOSPADM

## 2019-02-14 RX ORDER — KETOROLAC TROMETHAMINE 30 MG/ML
60 INJECTION, SOLUTION INTRAMUSCULAR; INTRAVENOUS ONCE
Status: COMPLETED | OUTPATIENT
Start: 2019-02-14 | End: 2019-02-14

## 2019-02-14 RX ORDER — IBUPROFEN 600 MG/1
600 TABLET ORAL EVERY 6 HOURS PRN
Status: DISCONTINUED | OUTPATIENT
Start: 2019-02-14 | End: 2019-02-15 | Stop reason: HOSPADM

## 2019-02-14 RX ORDER — OXYTOCIN/0.9 % SODIUM CHLORIDE 30/500 ML
2-30 PLASTIC BAG, INJECTION (ML) INTRAVENOUS
Status: DISCONTINUED | OUTPATIENT
Start: 2019-02-14 | End: 2019-02-14 | Stop reason: HOSPADM

## 2019-02-14 RX ORDER — SODIUM CHLORIDE 0.9 % (FLUSH) 0.9 %
3 SYRINGE (ML) INJECTION EVERY 12 HOURS SCHEDULED
Status: DISCONTINUED | OUTPATIENT
Start: 2019-02-14 | End: 2019-02-14 | Stop reason: HOSPADM

## 2019-02-14 RX ORDER — SODIUM CHLORIDE 0.9 % (FLUSH) 0.9 %
3-10 SYRINGE (ML) INJECTION AS NEEDED
Status: DISCONTINUED | OUTPATIENT
Start: 2019-02-14 | End: 2019-02-14 | Stop reason: HOSPADM

## 2019-02-14 RX ORDER — FAMOTIDINE 10 MG/ML
20 INJECTION, SOLUTION INTRAVENOUS
Status: DISCONTINUED | OUTPATIENT
Start: 2019-02-14 | End: 2019-02-14

## 2019-02-14 RX ORDER — PRENATAL VIT/IRON FUM/FOLIC AC 27MG-0.8MG
1 TABLET ORAL DAILY
Status: DISCONTINUED | OUTPATIENT
Start: 2019-02-14 | End: 2019-02-15 | Stop reason: HOSPADM

## 2019-02-14 RX ORDER — FENTANYL CITRATE 50 UG/ML
INJECTION, SOLUTION INTRAMUSCULAR; INTRAVENOUS AS NEEDED
Status: DISCONTINUED | OUTPATIENT
Start: 2019-02-14 | End: 2019-02-14 | Stop reason: SURG

## 2019-02-14 RX ORDER — SODIUM CHLORIDE 0.9 % (FLUSH) 0.9 %
1-10 SYRINGE (ML) INJECTION AS NEEDED
Status: DISCONTINUED | OUTPATIENT
Start: 2019-02-14 | End: 2019-02-15 | Stop reason: HOSPADM

## 2019-02-14 RX ORDER — CARBOPROST TROMETHAMINE 250 UG/ML
250 INJECTION, SOLUTION INTRAMUSCULAR AS NEEDED
Status: DISCONTINUED | OUTPATIENT
Start: 2019-02-14 | End: 2019-02-14 | Stop reason: HOSPADM

## 2019-02-14 RX ORDER — ZOLPIDEM TARTRATE 5 MG/1
5 TABLET ORAL NIGHTLY PRN
Status: DISCONTINUED | OUTPATIENT
Start: 2019-02-14 | End: 2019-02-15 | Stop reason: HOSPADM

## 2019-02-14 RX ORDER — LIDOCAINE HYDROCHLORIDE 10 MG/ML
5 INJECTION, SOLUTION EPIDURAL; INFILTRATION; INTRACAUDAL; PERINEURAL AS NEEDED
Status: DISCONTINUED | OUTPATIENT
Start: 2019-02-14 | End: 2019-02-14 | Stop reason: HOSPADM

## 2019-02-14 RX ORDER — DIPHENHYDRAMINE HYDROCHLORIDE 50 MG/ML
12.5 INJECTION INTRAMUSCULAR; INTRAVENOUS EVERY 8 HOURS PRN
Status: DISCONTINUED | OUTPATIENT
Start: 2019-02-14 | End: 2019-02-14 | Stop reason: HOSPADM

## 2019-02-14 RX ORDER — PANTOPRAZOLE SODIUM 20 MG/1
20 TABLET, DELAYED RELEASE ORAL
Status: DISCONTINUED | OUTPATIENT
Start: 2019-02-14 | End: 2019-02-14

## 2019-02-14 RX ORDER — MISOPROSTOL 200 UG/1
800 TABLET ORAL AS NEEDED
Status: DISCONTINUED | OUTPATIENT
Start: 2019-02-14 | End: 2019-02-14 | Stop reason: HOSPADM

## 2019-02-14 RX ORDER — ACETAMINOPHEN 325 MG/1
650 TABLET ORAL EVERY 4 HOURS PRN
Status: DISCONTINUED | OUTPATIENT
Start: 2019-02-14 | End: 2019-02-14

## 2019-02-14 RX ORDER — CALCIUM CARBONATE 200(500)MG
2 TABLET,CHEWABLE ORAL 3 TIMES DAILY PRN
Status: DISCONTINUED | OUTPATIENT
Start: 2019-02-14 | End: 2019-02-15 | Stop reason: HOSPADM

## 2019-02-14 RX ORDER — EPHEDRINE SULFATE 50 MG/ML
5 INJECTION, SOLUTION INTRAVENOUS
Status: DISCONTINUED | OUTPATIENT
Start: 2019-02-14 | End: 2019-02-14 | Stop reason: HOSPADM

## 2019-02-14 RX ORDER — BUPIVACAINE HYDROCHLORIDE 2.5 MG/ML
INJECTION, SOLUTION EPIDURAL; INFILTRATION; INTRACAUDAL AS NEEDED
Status: DISCONTINUED | OUTPATIENT
Start: 2019-02-14 | End: 2019-02-14 | Stop reason: SURG

## 2019-02-14 RX ORDER — LIDOCAINE HYDROCHLORIDE AND EPINEPHRINE 15; 5 MG/ML; UG/ML
INJECTION, SOLUTION EPIDURAL AS NEEDED
Status: DISCONTINUED | OUTPATIENT
Start: 2019-02-14 | End: 2019-02-14 | Stop reason: SURG

## 2019-02-14 RX ORDER — ONDANSETRON 2 MG/ML
4 INJECTION INTRAMUSCULAR; INTRAVENOUS ONCE AS NEEDED
Status: DISCONTINUED | OUTPATIENT
Start: 2019-02-14 | End: 2019-02-14 | Stop reason: HOSPADM

## 2019-02-14 RX ORDER — DOCUSATE SODIUM 100 MG/1
100 CAPSULE, LIQUID FILLED ORAL 2 TIMES DAILY
Status: DISCONTINUED | OUTPATIENT
Start: 2019-02-14 | End: 2019-02-15 | Stop reason: HOSPADM

## 2019-02-14 RX ORDER — ONDANSETRON 2 MG/ML
4 INJECTION INTRAMUSCULAR; INTRAVENOUS EVERY 6 HOURS PRN
Status: DISCONTINUED | OUTPATIENT
Start: 2019-02-14 | End: 2019-02-15 | Stop reason: HOSPADM

## 2019-02-14 RX ORDER — METHYLERGONOVINE MALEATE 0.2 MG/ML
200 INJECTION INTRAVENOUS ONCE AS NEEDED
Status: DISCONTINUED | OUTPATIENT
Start: 2019-02-14 | End: 2019-02-14 | Stop reason: HOSPADM

## 2019-02-14 RX ORDER — SODIUM CHLORIDE, SODIUM LACTATE, POTASSIUM CHLORIDE, CALCIUM CHLORIDE 600; 310; 30; 20 MG/100ML; MG/100ML; MG/100ML; MG/100ML
125 INJECTION, SOLUTION INTRAVENOUS CONTINUOUS
Status: DISCONTINUED | OUTPATIENT
Start: 2019-02-14 | End: 2019-02-14

## 2019-02-14 RX ADMIN — OXYTOCIN-SODIUM CHLORIDE 0.9% IV SOLN 30 UNIT/500ML 2 MILLI-UNITS/MIN: 30-0.9/5 SOLUTION at 05:16

## 2019-02-14 RX ADMIN — FENTANYL CITRATE 100 MCG: 50 INJECTION, SOLUTION INTRAMUSCULAR; INTRAVENOUS at 08:04

## 2019-02-14 RX ADMIN — ACETAMINOPHEN 650 MG: 325 TABLET, FILM COATED ORAL at 16:06

## 2019-02-14 RX ADMIN — ACETAMINOPHEN 650 MG: 325 TABLET, FILM COATED ORAL at 20:22

## 2019-02-14 RX ADMIN — SODIUM CHLORIDE, POTASSIUM CHLORIDE, SODIUM LACTATE AND CALCIUM CHLORIDE 125 ML/HR: 600; 310; 30; 20 INJECTION, SOLUTION INTRAVENOUS at 04:22

## 2019-02-14 RX ADMIN — SODIUM CHLORIDE, SODIUM LACTATE, POTASSIUM CHLORIDE, CALCIUM CHLORIDE 125 ML/HR: 600; 310; 30; 20 INJECTION, SOLUTION INTRAVENOUS at 04:22

## 2019-02-14 RX ADMIN — SODIUM CHLORIDE, POTASSIUM CHLORIDE, SODIUM LACTATE AND CALCIUM CHLORIDE 999 ML/HR: 600; 310; 30; 20 INJECTION, SOLUTION INTRAVENOUS at 07:55

## 2019-02-14 RX ADMIN — BUPIVACAINE HYDROCHLORIDE 8 ML: 2.5 INJECTION, SOLUTION EPIDURAL; INFILTRATION; INTRACAUDAL; PERINEURAL at 08:04

## 2019-02-14 RX ADMIN — LIDOCAINE HYDROCHLORIDE AND EPINEPHRINE 3 ML: 15; 5 INJECTION, SOLUTION EPIDURAL at 07:59

## 2019-02-14 RX ADMIN — Medication 10 ML/HR: at 08:06

## 2019-02-14 RX ADMIN — SODIUM CHLORIDE, SODIUM LACTATE, POTASSIUM CHLORIDE, CALCIUM CHLORIDE 999 ML/HR: 600; 310; 30; 20 INJECTION, SOLUTION INTRAVENOUS at 07:55

## 2019-02-14 RX ADMIN — KETOROLAC TROMETHAMINE 60 MG: 60 INJECTION, SOLUTION INTRAMUSCULAR at 21:40

## 2019-02-14 NOTE — NURSING NOTE
Clemons placement attempted by RN. Patient flinching and grimacing with betadine swabs while cleaning. RN offered to place patient in throne position and reattempt clemons placement after a short time. Patient requests that RN do so.

## 2019-02-14 NOTE — ANESTHESIA PREPROCEDURE EVALUATION
Anesthesia Evaluation     Patient summary reviewed and Nursing notes reviewed   NPO Solid Status: > 8 hours  NPO Liquid Status: < 2 hours           Airway   Mallampati: II  TM distance: >3 FB  No difficulty expected  Dental - normal exam     Pulmonary - normal exam   (+) asthma,   Cardiovascular - normal exam        Neuro/Psych  GI/Hepatic/Renal/Endo    (+)  GERD,      Musculoskeletal     Abdominal    Substance History      OB/GYN    (+) Pregnant,     Comment:  induction of labor      Other                        Anesthesia Plan    ASA 2     epidural     Anesthetic plan, all risks, benefits, and alternatives have been provided, discussed and informed consent has been obtained with: patient.

## 2019-02-14 NOTE — ANESTHESIA PROCEDURE NOTES
Labor Epidural      Patient reassessed immediately prior to procedure    Start Time: 2/14/2019 7:41 AM  Stop Time: 2/14/2019 7:57 AM  Performed By  CRNA: Korina Culp CRNA  Preanesthetic Checklist  Completed: patient identified, site marked, surgical consent, pre-op evaluation, timeout performed, IV checked, risks and benefits discussed and monitors and equipment checked  Prep:  Pt Position:sitting  Sterile Tech:cap, gloves, mask and sterile barrier  Prep:povidone-iodine 7.5% surgical scrub  Monitoring:blood pressure monitoring and continuous pulse oximetry  Epidural Block Procedure:  Approach:midline  Guidance:landmark technique and palpation technique  Location:L2-L3  Needle Type:Tuohy  Needle Gauge:17 G  Loss of Resistance Medium: saline  Loss of Resistance: 5cm  Cath Depth at skin:9 cm  Paresthesia: none  Aspiration:negative  Test Dose:negative  Number of Attempts: 1  Post Assessment:  Dressing:occlusive dressing applied and secured with tape  Pt Tolerance:patient tolerated the procedure well with no apparent complications  Complications:no

## 2019-02-14 NOTE — PLAN OF CARE
Problem: Patient Care Overview  Goal: Individualization and Mutuality  Outcome: Ongoing (interventions implemented as appropriate)   19 1211   Individualization   Patient Specific Preferences bottle feed   Patient Specific Goals (Include Timeframe) wishes to shower as soon as possible and wear own clothing   Patient Specific Interventions attempt ambulation to bathroom at end of recovery with assistance of RN.    Mutuality/Individual Preferences   What Anxieties, Fears, Concerns, or Questions Do You Have About Your Care? denies   Mutuality/Individual Preferences   How to Address Anxieties/Fears denies     Goal: Discharge Needs Assessment  Outcome: Ongoing (interventions implemented as appropriate)    Goal: Interprofessional Rounds/Family Conf  Outcome: Ongoing (interventions implemented as appropriate)      Problem: Fall Risk,  (Adult,Obstetrics,Pediatric)  Goal: Identify Related Risk Factors and Signs and Symptoms  Outcome: Ongoing (interventions implemented as appropriate)    Goal: Absence of Maternal Fall  Outcome: Ongoing (interventions implemented as appropriate)    Goal: Absence of  Fall/Drop  Outcome: Ongoing (interventions implemented as appropriate)      Problem: Skin Injury Risk (Adult)  Goal: Identify Related Risk Factors and Signs and Symptoms  Outcome: Ongoing (interventions implemented as appropriate)    Goal: Skin Health and Integrity  Outcome: Ongoing (interventions implemented as appropriate)      Problem: Anesthesia/Analgesia, Neuraxial (Obstetrics)  Goal: Signs and Symptoms of Listed Potential Problems Will be Absent, Minimized or Managed (Anesthesia/Analgesia, Neuraxial)  Outcome: Ongoing (interventions implemented as appropriate)      Problem: Postpartum (Vaginal Delivery) (Adult,Obstetrics,Pediatric)  Goal: Signs and Symptoms of Listed Potential Problems Will be Absent, Minimized or Managed (Postpartum)  Outcome: Ongoing (interventions implemented as appropriate)

## 2019-02-14 NOTE — PLAN OF CARE
Problem: Labor (Cervical Ripen, Induct, Augment) (Adult,Obstetrics,Pediatric)  Goal: Signs and Symptoms of Listed Potential Problems Will be Absent, Minimized or Managed (Labor)  Outcome: Outcome(s) achieved Date Met: 02/14/19 02/14/19 1142   Goal/Outcome Evaluation   Problems Assessed (Labor) all   Problems Present (Labor) none

## 2019-02-14 NOTE — NURSING NOTE
Dr. Ruiz on unit, tracing reviewed, informed of interventions and Pitocin stopped. Order to restart Pitocin at 2 after 20 minutes if NST reactive.

## 2019-02-14 NOTE — L&D DELIVERY NOTE
[facility]  Vaginal Delivery Note    Delivery     Delivery: Vaginal, Spontaneous     YOB: 2020    Time of Birth: 9:59 AM      Anesthesia: Epidural     Delivering clinician:  Adam James MD      Delivery narrative: 18-year-old G3 now  at 40 weeks and 2 days undergoing labor augmentation.  Spontaneous vaginal delivery of a 7 pound 5 ounce female infant named Eleno over an intact perineum under epidural anesthesia.  Placenta delivered spontaneously and intact with three-vessel cord.    Complications  none    Infant    Findings: female  infant     Infant observations: Weight: 3320 g (7 lb 5.1 oz)   Length: 19.016  in  Observations/Comments:      Apgars: 9   @ 1 minute /    9   @ 5 minutes     Placenta, Cord, and Fluid    Placenta delivered  Spontaneous  at   2/14/2019 10:07 AM     Cord: 3 vessels  present.   Nuchal Cord?  no   Cord blood obtained: Yes    Cord gases obtained:  No      Repair    Episiotomy: None    Lacerations: No   Estimated Blood Loss: 350 ml           Disposition  Mother to Mother Baby/Postpartum  in stable condition currently.  Baby to remains with mom  in stable condition currently.          This document has been electronically signed by Adam James MD on February 14, 2019 11:09 AM

## 2019-02-14 NOTE — PAYOR COMM NOTE
"Galo Miranda (18 y.o. Female)     Date of Birth Social Security Number Address Home Phone MRN    2000  [id number] [id number] [phone] [id number]    Congregational Marital Status          None Single       Admission Date Admission Type Admitting Provider Attending Provider Department, Room/Bed    2/14/19 Elective Adam James MD Stitt, John C, MD [facility] MOTHER BABY, M753/1    Discharge Date Discharge Disposition Discharge Destination                       Attending Provider:  Adam James MD    Allergies:  No Known Allergies    Isolation:  None   Infection:  None   Code Status:  CPR    Ht:  160 cm (63\")   Wt:  61.1 kg (134 lb 12 oz)    Admission Cmt:  None   Principal Problem:  None                Active Insurance as of 2/14/2019     Primary Coverage     Payor Plan Insurance Group Employer/Plan Group    [employer] [place] [facility]      Payor Plan Address Payor Plan Phone Number Payor Plan Fax Number Effective Dates    [address] [address]   2/1/2017 - None Entered    [place] [address]       Subscriber Name Subscriber Birth Date Member ID       GALO MIRANDA 2000 [id number]                 Emergency Contacts      (Rel.) Home Phone Work Phone Mobile Phone    Raymundo Stubbs (Sister) [phone]               History & Physical      Adam James MD at 2/14/2019  7:45 AM        H&P reviewed. The patient was examined and there are no changes to the H&P.   She is having a girl named Eleno.  Reactive fetal heart rate tracing.  Cervix 4/80%/-1/AROM/clear and copious fluid  May have epidural.  Anticipate vaginal delivery.    Electronically signed by Adam James MD at 2/14/2019  7:46 AM   Source Note               Obstetric History and Physical    Chief Complaint   Patient presents with   • OB Follow up     40w0d   • High Risk Gestation     Galo Gil " Becca is a 18 y.o. year old .      - B+, RI, HIV neg, RPR NR, HBsAg neg, GC/CT neg/neg; GBS  - RTC in 2 weeks NELIDA with MD     2. Late Prenatal Care  - IG US Q4w      3. Tobacco use    January 15, 2019 GBS negative.    She is having a girl named Blue.    2019, ultrasound shows single intrauterine pregnancy in the cephalic position with fetal heart rate 132 bpm.  ELMER normal at 18.82 cm.  Biophysical profile 8 out of 8.  Estimated fetal weight 8 pounds 3 ounces or 56 percentile.  No abnormality seen.  Cervix 3/50%/-3.  Patient desires labor augmentation  at 0400.     Obstetric History   #: 1, Date: , Sex: Male, Weight: 3062 g (6 lb 12 oz), GA: 40w0d, Delivery: Vaginal, Spontaneous, Apgar1: None, Apgar5: None, Living: Living, Birth Comments: None    #: 2, Date: 10/05/17, Sex: Female, Weight: 3402 g (7 lb 8 oz), GA: 40w0d, Delivery: Vaginal, Spontaneous, Apgar1: 8, Apgar5: 9, Living: Living, Birth Comments: None    #: 3, Date: None, Sex: None, Weight: None, GA: None, Delivery: None, Apgar1: None, Apgar5: None, Living: None, Birth Comments: None      The following portions of the patients history were reviewed and updated as appropriate: current medications, allergies, past medical history, past surgical history, past family history, past social history and problem list .       Prenatal Information:  Prenatal Results     Initial Prenatal Labs     Test Value Reference Range Date Time    Hemoglobin 10.4 g/dL 12.0 - 15.5 g/dL 10/03/18 1412    Hematocrit 30.9 % 35.0 - 45.0 % 10/03/18 1412    Platelets 242 10*3/mm3 150 - 450 10*3/mm3 10/03/18 1412    Rubella IgG 37.2 IU/mL 0.0 - 9.9 IU/mL 10/03/18 1412      Immune  Immune 10/03/18 1412    Hepatitis B SAg Negative  Negative 10/03/18 141    Hepatitis C Ab Negative  Negative 10/03/18 1412    RPR Non-Reactive  Non-Reactive 10/03/18 1412    ABO B   10/03/18 1412    Rh Positive   10/03/18 1412    Antibody Screen Negative    10/03/18 1412    HIV Negative  Negative 10/03/18 1412    Urine Culture No growth at 24 hours   12/20/18 1012    Gonorrhea Negative  Negative 12/20/18 1012    Chlamydia Negative  Negative 12/20/18 1012    TSH 1.60 uIU/ml 0.46 - 4.68 uIU/ml 03/12/15 1103          2nd and 3rd Trimester     Test Value Reference Range Date Time    Hemoglobin (repeated)        Hematocrit (repeated)        GCT        Antibody Screen (repeated)        GTT Fasting        GTT 1 Hr        GTT 2 Hr        GTT 3 Hr        Group B Strep Negative  Negative 01/15/19 0816          Drug Screening     Test Value Reference Range Date Time    Amphetamine Screen Negative  Negative 12/20/18 1012    Barbiturate Screen Negative  Negative 12/20/18 1012    Benzodiazepine Screen Negative  Negative 12/20/18 1012    Methadone Screen Negative  Negative 12/20/18 1012    Phencyclidine Screen        Opiates Screen Negative  Negative 12/20/18 1012    THC Screen Negative  Negative 12/20/18 1012    Cocaine Screen Negative  Negative 12/20/18 1012    Propoxyphene Screen        Buprenorphine Screen        Methamphetamine Screen        Oxycodone Screen Negative  Negative 12/20/18 1012    Tricyclic Antidepressants Screen              Other (Risk screening)     Test Value Reference Range Date Time    Varicella IgG        Parvovirus IgG        CMV IgG        Cystic Fibrosis        Hemoglobin electrophoresis        NIPT        MSAFP-4        AFP (for NTD only)                  External Prenatal Results     Pregnancy Outside Results - Transcribed From Office Records - See Scanned Records For Details     Test Value Date Time    Hgb 10.4 g/dL 10/03/18 1412    Hct 30.9 % 10/03/18 1412    ABO B  10/03/18 1412    Rh Positive  10/03/18 1412    Antibody Screen Negative  10/03/18 1412    Glucose Fasting GTT       Glucose Tolerance Test 1 hour       Glucose Tolerance Test 3 hour       Gonorrhea (discrete) Negative  12/20/18 1012    Chlamydia (discrete) Negative  12/20/18 1012    RPR  Non-Reactive  10/03/18 1412    VDRL       Syphilis Antibody       Rubella 37.2 IU/mL 10/03/18 1412      Immune  10/03/18 1412    HBsAg Negative  10/03/18 1412    Herpes Simplex Virus PCR       Herpes Simplex VIrus Culture       HIV Negative  10/03/18 1412    Hep C RNA Quant PCR       Hep C Antibody Negative  10/03/18 1412    AFP       Group B Strep Negative  01/15/19 0816    GBS Susceptibility to Clindamycin       GBS Susceptibility to Erythromycin       Fetal Fibronectin       Genetic Testing, Maternal Blood             Drug Screening     Test Value Date Time    Urine Drug Screen       Amphetamine Screen Negative  18 1012    Barbiturate Screen Negative  18 1012    Benzodiazepine Screen Negative  18 1012    Methadone Screen Negative  18 1012    Phencyclidine Screen       Opiates Screen Negative  18 1012    THC Screen Negative  18 1012    Cocaine Screen       Propoxyphene Screen       Buprenorphine Screen       Methamphetamine Screen       Oxycodone Screen Negative  18 1012    Tricyclic Antidepressants Screen                    Past OB History:     Obstetric History       T2      L2     SAB0   TAB0   Ectopic0   Molar0   Multiple0   Live Births2       # Outcome Date GA Lbr Ta/2nd Weight Sex Delivery Anes PTL Lv   3 Current            2 Term 10/05/17 40w0d 19:12 / 00:15 3402 g (7 lb 8 oz) F Vag-Spont EPI N JUAN RAMON      Name: NNEKA MIRANDAYOLANDA      Apgar1:  8                Apgar5: 9   1 Term  40w0d  3062 g (6 lb 12 oz) M Vag-Spont   JUAN RAMON           ALLERGIES:   No Known Allergies     Home Medications:     Prior to Admission medications    Medication Sig Start Date End Date Taking? Authorizing Provider   hydrOXYzine (VISTARIL) 25 MG capsule Take 1 capsule by mouth 3 (Three) Times a Day As Needed for Itching. 1/15/19  Yes Karuna Jay CNM   omeprazole (PRILOSEC) 20 MG capsule Take 1 capsule by mouth Daily. 19 Yes Karuna Jay CNM    Prenatal Vit-Fe Fumarate-FA (MYNATAL PLUS) tablet Take 1 tablet by mouth Daily. 1/7/19  Yes Karuna Jay CNM       Past Medical History: Past Medical History:   Diagnosis Date   • Acute pharyngitis    • Asthma    • Encounter for initial prescription of contraceptive pills    • Fever    • GERD (gastroesophageal reflux disease)    • Herpes labialis    • History of MRSA infection    • Nausea       Past Surgical History Past Surgical History:   Procedure Laterality Date   • ADENOIDECTOMY     • TONSILLECTOMY     • TONSILLECTOMY AND ADENOIDECTOMY     • TYMPANOSTOMY        Family History: Family History   Problem Relation Age of Onset   • Lung cancer Paternal Grandfather    • Breast cancer Neg Hx    • Uterine cancer Neg Hx    • Ovarian cancer Neg Hx    • Colon cancer Neg Hx       Social History:  reports that she has been smoking cigarettes.  She has been smoking about 1.00 pack per day. she has never used smokeless tobacco.   reports that she does not drink alcohol.   reports that she does not use drugs.     Review of Systems   Constitutional: Negative for activity change, appetite change, chills, diaphoresis, fatigue, fever and unexpected weight change.   Gastrointestinal: Negative for abdominal pain, constipation, diarrhea and nausea.   Genitourinary: Negative for difficulty urinating, dyspareunia, dysuria, menstrual problem, pelvic pain, urgency, vaginal bleeding, vaginal discharge and vaginal pain.   Neurological: Negative for headaches.   Psychiatric/Behavioral: Negative for dysphoric mood. The patient is not nervous/anxious.    All other systems reviewed and are negative.    Objective       Vital Signs Range for the last 24 hours  Temperature:     Temp Source:     BP: BP: (100)/(60) 100/60   Pulse:     Respirations:     SPO2:     O2 Amount (l/min):     O2 Devices     Weight: Weight:  [61.1 kg (134 lb 12.8 oz)] 61.1 kg (134 lb 12.8 oz)       OBGyn Exam  Physical Examination: General appearance - alert,  well appearing, and in no distress and oriented to person, place, and time  Mental status - alert, oriented to person, place, and time, normal mood, behavior, speech, dress, motor activity, and thought processes  Neck - supple, no significant adenopathy  Chest - clear to auscultation, no wheezes, rales or rhonchi, symmetric air entry, no tachypnea, retractions or cyanosis  Heart - normal rate, regular rhythm, normal S1, S2, no murmurs, rubs, clicks or gallops  Abdomen - Gravid and nontender  Extremities - peripheral pulses normal, no pedal edema, no clubbing or cyanosis                  Assessment:  1. Intrauterine pregnancy at 40 weeks and 2 days 2019 with favorable cervix desiring labor augmentation.    GBS status: Results in Past 30 Days  Result Component Current Result Ref Range Previous Result Ref Range   Group B Strep, DNA Negative (1/15/2019) Negative Not in Time Range        Plan:  1. Admit to labor and delivery on Thursday, 2019 0400 labor augmentation.  2. Plan of care has been reviewed with staff, patient, and patient's family.  3. Risks, benefits of treatment plan have been discussed.  4. All questions have been answered.      Adam James MD  2019  12:49 AM       Electronically signed by Adam James MD at 2019 12:53 AM             Adam James MD at 2019 10:00 AM            Obstetric History and Physical    Chief Complaint   Patient presents with   • OB Follow up     40w0d   • High Risk Gestation     Shanika Hudson is a 18 y.o. year old .      - B+, RI, HIV neg, RPR NR, HBsAg neg, GC/CT neg/neg; GBS  - RTC in 2 weeks NELIDA with MD     2. Late Prenatal Care  - IG US Q4w      3. Tobacco use    January 15, 2019 GBS negative.    She is having a girl named Blue.    2019, ultrasound shows single intrauterine pregnancy in the cephalic position with fetal heart rate 132 bpm.  ELMER normal at 18.82 cm.  Biophysical profile 8 out of  8.  Estimated fetal weight 8 pounds 3 ounces or 56 percentile.  No abnormality seen.  Cervix 3/50%/-3.  Patient desires labor augmentation Thursday, February 14, 2019 at 0400.     Obstetric History   #: 1, Date: 2015, Sex: Male, Weight: 3062 g (6 lb 12 oz), GA: 40w0d, Delivery: Vaginal, Spontaneous, Apgar1: None, Apgar5: None, Living: Living, Birth Comments: None    #: 2, Date: 10/05/17, Sex: Female, Weight: 3402 g (7 lb 8 oz), GA: 40w0d, Delivery: Vaginal, Spontaneous, Apgar1: 8, Apgar5: 9, Living: Living, Birth Comments: None    #: 3, Date: None, Sex: None, Weight: None, GA: None, Delivery: None, Apgar1: None, Apgar5: None, Living: None, Birth Comments: None      The following portions of the patients history were reviewed and updated as appropriate: current medications, allergies, past medical history, past surgical history, past family history, past social history and problem list .       Prenatal Information:  Prenatal Results     Initial Prenatal Labs     Test Value Reference Range Date Time    Hemoglobin 10.4 g/dL 12.0 - 15.5 g/dL 10/03/18 1412    Hematocrit 30.9 % 35.0 - 45.0 % 10/03/18 1412    Platelets 242 10*3/mm3 150 - 450 10*3/mm3 10/03/18 1412    Rubella IgG 37.2 IU/mL 0.0 - 9.9 IU/mL 10/03/18 1412      Immune  Immune 10/03/18 1412    Hepatitis B SAg Negative  Negative 10/03/18 1412    Hepatitis C Ab Negative  Negative 10/03/18 1412    RPR Non-Reactive  Non-Reactive 10/03/18 1412    ABO B   10/03/18 1412    Rh Positive   10/03/18 1412    Antibody Screen Negative   10/03/18 1412    HIV Negative  Negative 10/03/18 1412    Urine Culture No growth at 24 hours   12/20/18 1012    Gonorrhea Negative  Negative 12/20/18 1012    Chlamydia Negative  Negative 12/20/18 1012    TSH 1.60 uIU/ml 0.46 - 4.68 uIU/ml 03/12/15 1103          2nd and 3rd Trimester     Test Value Reference Range Date Time    Hemoglobin (repeated)        Hematocrit (repeated)        GCT        Antibody Screen (repeated)        GTT Fasting         GTT 1 Hr        GTT 2 Hr        GTT 3 Hr        Group B Strep Negative  Negative 01/15/19 0816          Drug Screening     Test Value Reference Range Date Time    Amphetamine Screen Negative  Negative 12/20/18 1012    Barbiturate Screen Negative  Negative 12/20/18 1012    Benzodiazepine Screen Negative  Negative 12/20/18 1012    Methadone Screen Negative  Negative 12/20/18 1012    Phencyclidine Screen        Opiates Screen Negative  Negative 12/20/18 1012    THC Screen Negative  Negative 12/20/18 1012    Cocaine Screen Negative  Negative 12/20/18 1012    Propoxyphene Screen        Buprenorphine Screen        Methamphetamine Screen        Oxycodone Screen Negative  Negative 12/20/18 1012    Tricyclic Antidepressants Screen              Other (Risk screening)     Test Value Reference Range Date Time    Varicella IgG        Parvovirus IgG        CMV IgG        Cystic Fibrosis        Hemoglobin electrophoresis        NIPT        MSAFP-4        AFP (for NTD only)                  External Prenatal Results     Pregnancy Outside Results - Transcribed From Office Records - See Scanned Records For Details     Test Value Date Time    Hgb 10.4 g/dL 10/03/18 1412    Hct 30.9 % 10/03/18 1412    ABO B  10/03/18 1412    Rh Positive  10/03/18 1412    Antibody Screen Negative  10/03/18 1412    Glucose Fasting GTT       Glucose Tolerance Test 1 hour       Glucose Tolerance Test 3 hour       Gonorrhea (discrete) Negative  12/20/18 1012    Chlamydia (discrete) Negative  12/20/18 1012    RPR Non-Reactive  10/03/18 1412    VDRL       Syphilis Antibody       Rubella 37.2 IU/mL 10/03/18 1412      Immune  10/03/18 1412    HBsAg Negative  10/03/18 1412    Herpes Simplex Virus PCR       Herpes Simplex VIrus Culture       HIV Negative  10/03/18 1412    Hep C RNA Quant PCR       Hep C Antibody Negative  10/03/18 1412    AFP       Group B Strep Negative  01/15/19 0816    GBS Susceptibility to Clindamycin       GBS Susceptibility to  Erythromycin       Fetal Fibronectin       Genetic Testing, Maternal Blood             Drug Screening     Test Value Date Time    Urine Drug Screen       Amphetamine Screen Negative  18 1012    Barbiturate Screen Negative  18 1012    Benzodiazepine Screen Negative  18 1012    Methadone Screen Negative  18 1012    Phencyclidine Screen       Opiates Screen Negative  18 1012    THC Screen Negative  18 1012    Cocaine Screen       Propoxyphene Screen       Buprenorphine Screen       Methamphetamine Screen       Oxycodone Screen Negative  18 1012    Tricyclic Antidepressants Screen                    Past OB History:     Obstetric History       T2      L2     SAB0   TAB0   Ectopic0   Molar0   Multiple0   Live Births2       # Outcome Date GA Lbr Ta/2nd Weight Sex Delivery Anes PTL Lv   3 Current            2 Term 10/05/17 40w0d 19:12 / 00:15 3402 g (7 lb 8 oz) F Vag-Spont EPI N JUAN RAMON      Name: NNEKA MIRANDAKENDRARALPHMARIBEL      Apgar1:  8                Apgar5: 9   1 Term  40w0d  3062 g (6 lb 12 oz) M Vag-Spont   JUAN RAMON           ALLERGIES:   No Known Allergies     Home Medications:     Prior to Admission medications    Medication Sig Start Date End Date Taking? Authorizing Provider   hydrOXYzine (VISTARIL) 25 MG capsule Take 1 capsule by mouth 3 (Three) Times a Day As Needed for Itching. 1/15/19  Yes Karuna Jay CNM   omeprazole (PRILOSEC) 20 MG capsule Take 1 capsule by mouth Daily. 19 Yes Karuna Jay CNM   Prenatal Vit-Fe Fumarate-FA (MYNATAL PLUS) tablet Take 1 tablet by mouth Daily. 19  Yes Karuna Jay CNM       Past Medical History: Past Medical History:   Diagnosis Date   • Acute pharyngitis    • Asthma    • Encounter for initial prescription of contraceptive pills    • Fever    • GERD (gastroesophageal reflux disease)    • Herpes labialis    • History of MRSA infection    • Nausea       Past Surgical History Past  Surgical History:   Procedure Laterality Date   • ADENOIDECTOMY     • TONSILLECTOMY     • TONSILLECTOMY AND ADENOIDECTOMY     • TYMPANOSTOMY        Family History: Family History   Problem Relation Age of Onset   • Lung cancer Paternal Grandfather    • Breast cancer Neg Hx    • Uterine cancer Neg Hx    • Ovarian cancer Neg Hx    • Colon cancer Neg Hx       Social History:  reports that she has been smoking cigarettes.  She has been smoking about 1.00 pack per day. she has never used smokeless tobacco.   reports that she does not drink alcohol.   reports that she does not use drugs.     Review of Systems   Constitutional: Negative for activity change, appetite change, chills, diaphoresis, fatigue, fever and unexpected weight change.   Gastrointestinal: Negative for abdominal pain, constipation, diarrhea and nausea.   Genitourinary: Negative for difficulty urinating, dyspareunia, dysuria, menstrual problem, pelvic pain, urgency, vaginal bleeding, vaginal discharge and vaginal pain.   Neurological: Negative for headaches.   Psychiatric/Behavioral: Negative for dysphoric mood. The patient is not nervous/anxious.    All other systems reviewed and are negative.    Objective       Vital Signs Range for the last 24 hours  Temperature:     Temp Source:     BP: BP: (100)/(60) 100/60   Pulse:     Respirations:     SPO2:     O2 Amount (l/min):     O2 Devices     Weight: Weight:  [61.1 kg (134 lb 12.8 oz)] 61.1 kg (134 lb 12.8 oz)       OBGyn Exam  Physical Examination: General appearance - alert, well appearing, and in no distress and oriented to person, place, and time  Mental status - alert, oriented to person, place, and time, normal mood, behavior, speech, dress, motor activity, and thought processes  Neck - supple, no significant adenopathy  Chest - clear to auscultation, no wheezes, rales or rhonchi, symmetric air entry, no tachypnea, retractions or cyanosis  Heart - normal rate, regular rhythm, normal S1, S2, no murmurs,  rubs, clicks or gallops  Abdomen - Gravid and nontender  Extremities - peripheral pulses normal, no pedal edema, no clubbing or cyanosis                  Assessment:  2. Intrauterine pregnancy at 40 weeks and 2 days February 14, 2019 with favorable cervix desiring labor augmentation.    GBS status: Results in Past 30 Days  Result Component Current Result Ref Range Previous Result Ref Range   Group B Strep, DNA Negative (1/15/2019) Negative Not in Time Range        Plan:  5. Admit to labor and delivery on Thursday, February 14, 2019 0400 labor augmentation.  6. Plan of care has been reviewed with staff, patient, and patient's family.  7. Risks, benefits of treatment plan have been discussed.  8. All questions have been answered.      Adam James MD  2/13/2019  12:49 AM      Electronically signed by Adam James MD at 2/13/2019 12:53 AM       Hospital Medications (all)       Dose Frequency Start End    acetaminophen (TYLENOL) tablet 650 mg 650 mg Every 4 Hours PRN 2/14/2019     Sig - Route: Take 2 tablets by mouth Every 4 (Four) Hours As Needed for Mild Pain . - Oral    Cosign for Ordering: Required by Adam James MD    benzocaine-lanolin-aloe vera (DERMOPLAST) 20-0.5 % topical spray  As Needed 2/14/2019     Sig - Route: Apply  topically to the appropriate area as directed As Needed for Mild Pain  (perineal pain). - Topical    Cosign for Ordering: Required by Adam James MD    bisacodyl (DULCOLAX) suppository 10 mg 10 mg Daily PRN 2/15/2019     Sig - Route: Insert 1 suppository into the rectum Daily As Needed for Constipation. - Rectal    Cosign for Ordering: Required by Adam James MD    calcium carbonate (TUMS) chewable tablet 500 mg (200 mg elemental) 2 tablet 3 Times Daily PRN 2/14/2019     Sig - Route: Chew 1,000 mg 3 (Three) Times a Day As Needed for Heartburn. - Oral    Cosign for Ordering: Required by Adam James MD    docusate sodium (COLACE) capsule 100 mg 100 mg 2 Times Daily 2/14/2019      Sig - Route: Take 1 capsule by mouth 2 (Two) Times a Day. - Oral    Cosign for Ordering: Required by Adam James MD    hydrocortisone (ANUSOL-HC) 2.5 % rectal cream 1 application 1 application As Needed 2/14/2019     Sig - Route: Insert 1 application into the rectum As Needed for Hemorrhoids. - Rectal    Cosign for Ordering: Required by Adam James MD    magnesium hydroxide (MILK OF MAGNESIA) suspension 2400 mg/10mL 10 mL 10 mL Daily PRN 2/14/2019     Sig - Route: Take 10 mL by mouth Daily As Needed for Constipation. - Oral    Cosign for Ordering: Required by Adam James MD    ondansetron (ZOFRAN) injection 4 mg 4 mg Every 6 Hours PRN 2/14/2019     Sig - Route: Infuse 2 mL into a venous catheter Every 6 (Six) Hours As Needed for Nausea or Vomiting. - Intravenous    Cosign for Ordering: Required by Adam James MD    ondansetron (ZOFRAN) tablet 4 mg 4 mg Every 8 Hours PRN 2/14/2019     Sig - Route: Take 1 tablet by mouth Every 8 (Eight) Hours As Needed for Nausea or Vomiting. - Oral    Cosign for Ordering: Required by Adam James MD    prenatal vitamin 27-0.8 tablet 1 tablet 1 tablet Daily 2/14/2019     Sig - Route: Take 1 tablet by mouth Daily. - Oral    Cosign for Ordering: Required by Adam James MD    sodium chloride 0.9 % flush 1-10 mL 1-10 mL As Needed 2/14/2019     Sig - Route: Infuse 1-10 mL into a venous catheter As Needed for Line Care. - Intravenous    Cosign for Ordering: Required by Adam James MD    zolpidem (AMBIEN) tablet 5 mg 5 mg Nightly PRN 2/14/2019 2/24/2019    Sig - Route: Take 1 tablet by mouth At Night As Needed for Sleep. - Oral    Cosign for Ordering: Required by Adam James MD    benzocaine (AMERICAINE) 20 % rectal ointment 1 application (Discontinued) 1 application As Needed 2/14/2019 2/14/2019    Sig - Route: Insert 1 application into the rectum As Needed for Hemorrhoids. - Rectal    Reason for Discontinue: Availability    Cosign for Ordering: Required by Jacob  "Adam ALMONTE MD    bupivacaine 0.125% in 100 ml 0.9% Sodium Chloride epidural (Discontinued)  Continuous 2/14/2019 2/14/2019    Sig - Route: by Epidural route Continuous. - Epidural    carboprost (HEMABATE) injection 250 mcg (Discontinued) 250 mcg As Needed 2/14/2019 2/14/2019    Sig - Route: Inject 1 mL into the appropriate muscle as directed by prescriber As Needed (hemorrhage). - Intramuscular    Reason for Discontinue: Patient Transfer    diphenhydrAMINE (BENADRYL) injection 12.5 mg (Discontinued) 12.5 mg Every 8 Hours PRN 2/14/2019 2/14/2019    Sig - Route: Infuse 0.25 mL into a venous catheter Every 8 (Eight) Hours As Needed for Itching. - Intravenous    Reason for Discontinue: Patient Transfer    ePHEDrine injection 5 mg (Discontinued) 5 mg Every 10 Minutes PRN 2/14/2019 2/14/2019    Sig - Route: Infuse 0.1 mL into a venous catheter Every 10 (Ten) Minutes As Needed (low blood pressure). - Intravenous    Reason for Discontinue: Patient Transfer    famotidine (PEPCID) injection 20 mg (Discontinued) 20 mg Every Early Morning 2/14/2019 2/14/2019    Sig - Route: Infuse 2 mL into a venous catheter Every Morning. - Intravenous    Linked Group 1:  \"Or\" Linked Group Details        famotidine (PEPCID) injection 20 mg (Discontinued) 20 mg Once As Needed 2/14/2019 2/14/2019    Sig - Route: Infuse 2 mL into a venous catheter 1 (One) Time As Needed (nausea). - Intravenous    Reason for Discontinue: Patient Transfer    lactated ringers bolus 1,000 mL (Discontinued) 1,000 mL Once 2/14/2019 2/14/2019    Sig - Route: Infuse 1,000 mL into a venous catheter 1 (One) Time. - Intravenous    Reason for Discontinue: Patient Transfer    lactated ringers infusion (Discontinued) 125 mL/hr Continuous 2/14/2019 2/14/2019    Sig - Route: Infuse 125 mL/hr into a venous catheter Continuous. - Intravenous    lidocaine PF 1% (XYLOCAINE) injection 5 mL (Discontinued) 5 mL As Needed 2/14/2019 2/14/2019    Sig - Route: Inject 5 mL into the appropriate " "area of the skin as directed by provider As Needed (IV starts). - Intradermal    Reason for Discontinue: Patient Transfer    methylergonovine (METHERGINE) injection 200 mcg (Discontinued) 200 mcg Once As Needed 2/14/2019 2/14/2019    Sig - Route: Inject 1 mL into the appropriate muscle as directed by prescriber 1 (One) Time As Needed (hemorrhage). - Intramuscular    Reason for Discontinue: Patient Transfer    misoprostol (CYTOTEC) tablet 800 mcg (Discontinued) 800 mcg As Needed 2/14/2019 2/14/2019    Sig - Route: Insert 4 tablets into the rectum As Needed (Postpartum Hemorrhage). - Rectal    Reason for Discontinue: Patient Transfer    ondansetron (ZOFRAN) injection 4 mg (Discontinued) 4 mg Once As Needed 2/14/2019 2/14/2019    Sig - Route: Infuse 2 mL into a venous catheter 1 (One) Time As Needed for Nausea or Vomiting. - Intravenous    Reason for Discontinue: Patient Transfer    Oxytocin-Lactated Ringers (PITOCIN) 20 UNIT/L in lactated Ringer's 1000 mL IVPB  - ADS Override Pull (Discontinued)   2/14/2019 2/14/2019    Notes to Pharmacy: MARBELLA QUIGLEY: cabinet override    Reason for Discontinue: Returned to ADS    Oxytocin-Sodium Chloride (PITOCIN) 30-0.9 UT/500ML-% infusion solution (Discontinued) 2-30 nghia-units/min Titrated 2/14/2019 2/14/2019    Sig - Route: Infuse 0.002-0.03 Units/min into a venous catheter Dose Adjusted By Provider As Needed. - Intravenous    Reason for Discontinue: Patient Transfer    pantoprazole (PROTONIX) EC tablet 20 mg (Discontinued) 20 mg Every Early Morning 2/14/2019 2/14/2019    Sig - Route: Take 1 tablet by mouth Every Morning. - Oral    Linked Group 1:  \"Or\" Linked Group Details        pramoxine-hydrocortisone 1-1 % foam (Discontinued)  As Needed 2/14/2019 2/14/2019    Sig - Route: Apply  topically to the appropriate area as directed As Needed for Hemorrhoids. - Topical    Reason for Discontinue: Availability    Cosign for Ordering: Required by Adam James MD    sodium chloride " 0.9 % flush 3 mL (Discontinued) 3 mL Every 12 Hours Scheduled 2/14/2019 2/14/2019    Sig - Route: Infuse 3 mL into a venous catheter Every 12 (Twelve) Hours. - Intravenous    Reason for Discontinue: Patient Transfer    sodium chloride 0.9 % flush 3-10 mL (Discontinued) 3-10 mL As Needed 2/14/2019 2/14/2019    Sig - Route: Infuse 3-10 mL into a venous catheter As Needed for Line Care. - Intravenous    Reason for Discontinue: Patient Transfer          Physician Progress Notes (last 24 hours) (Notes from 2/13/2019  2:56 PM through 2/14/2019  2:56 PM)     No notes of this type exist for this encounter.

## 2019-02-14 NOTE — PLAN OF CARE
Problem: Patient Care Overview  Goal: Plan of Care Review  Outcome: Ongoing (interventions implemented as appropriate)   02/14/19 0643   Coping/Psychosocial   Plan of Care Reviewed With patient;family;significant other   Plan of Care Review   Progress improving   OTHER   Outcome Summary SVE 4 cm, low dose induction pit infusing, pt wants epidural later, coping well with labor at this time, baby girl     Goal: Individualization and Mutuality  Outcome: Ongoing (interventions implemented as appropriate)    Goal: Discharge Needs Assessment  Outcome: Ongoing (interventions implemented as appropriate)    Goal: Interprofessional Rounds/Family Conf  Outcome: Ongoing (interventions implemented as appropriate)      Problem: Labor (Cervical Ripen, Induct, Augment) (Adult,Obstetrics,Pediatric)  Goal: Signs and Symptoms of Listed Potential Problems Will be Absent, Minimized or Managed (Labor)  Outcome: Ongoing (interventions implemented as appropriate)

## 2019-02-14 NOTE — INTERVAL H&P NOTE
H&P reviewed. The patient was examined and there are no changes to the H&P.   She is having a girl named Eleno.  Reactive fetal heart rate tracing.  Cervix 4/80%/-1/AROM/clear and copious fluid  May have epidural.  Anticipate vaginal delivery.

## 2019-02-14 NOTE — ANESTHESIA POSTPROCEDURE EVALUATION
Patient: Shanika Hudson    Procedure Summary     Date:  02/14/19 Room / Location:      Anesthesia Start:  0749 Anesthesia Stop:      Procedure:  LABOR ANALGESIA Diagnosis:      Scheduled Providers:   Provider:  Korina Culp CRNA    Anesthesia Type:  epidural ASA Status:  2          Anesthesia Type: epidural  Last vitals  BP   127/61 (02/14/19 1015)   Temp   98.3 °F (36.8 °C) (02/14/19 1015)   Pulse   116 (02/14/19 1015)   Resp   18 (02/14/19 1015)     SpO2   98 % (02/14/19 0421)     Post Anesthesia Care and Evaluation    Patient location during evaluation: bedside  Patient participation: complete - patient participated  Level of consciousness: awake and alert  Pain score: 0  Pain management: adequate  Airway patency: patent  Anesthetic complications: No anesthetic complications  PONV Status: none  Cardiovascular status: acceptable  Respiratory status: acceptable  Hydration status: acceptable  Post Neuraxial Block status: No signs or symptoms of PDPH

## 2019-02-15 VITALS
SYSTOLIC BLOOD PRESSURE: 119 MMHG | BODY MASS INDEX: 23.88 KG/M2 | TEMPERATURE: 97.9 F | HEART RATE: 80 BPM | WEIGHT: 134.75 LBS | OXYGEN SATURATION: 99 % | DIASTOLIC BLOOD PRESSURE: 76 MMHG | RESPIRATION RATE: 16 BRPM | HEIGHT: 63 IN

## 2019-02-15 PROBLEM — Z3A.40 40 WEEKS GESTATION OF PREGNANCY: Status: RESOLVED | Noted: 2019-02-14 | Resolved: 2019-02-15

## 2019-02-15 LAB
BASOPHILS # BLD AUTO: 0.05 10*3/MM3 (ref 0–0.2)
BASOPHILS NFR BLD AUTO: 0.4 % (ref 0–1.5)
DEPRECATED RDW RBC AUTO: 49.3 FL (ref 37–54)
EOSINOPHIL # BLD AUTO: 0.23 10*3/MM3 (ref 0–0.4)
EOSINOPHIL NFR BLD AUTO: 1.6 % (ref 0.3–6.2)
ERYTHROCYTE [DISTWIDTH] IN BLOOD BY AUTOMATED COUNT: 16 % (ref 12.3–15.4)
HCT VFR BLD AUTO: 29.6 % (ref 34–46.6)
HGB BLD-MCNC: 9.5 G/DL (ref 12–15.9)
IMM GRANULOCYTES # BLD AUTO: 0.06 10*3/MM3 (ref 0–0.05)
IMM GRANULOCYTES NFR BLD AUTO: 0.4 % (ref 0–0.5)
LARGE PLATELETS: NORMAL
LYMPHOCYTES # BLD AUTO: 4.72 10*3/MM3 (ref 0.7–3.1)
LYMPHOCYTES NFR BLD AUTO: 33.5 % (ref 19.6–45.3)
MCH RBC QN AUTO: 26.8 PG (ref 26.6–33)
MCHC RBC AUTO-ENTMCNC: 32.1 G/DL (ref 31.5–35.7)
MCV RBC AUTO: 83.6 FL (ref 79–97)
MONOCYTES # BLD AUTO: 1.14 10*3/MM3 (ref 0.1–0.9)
MONOCYTES NFR BLD AUTO: 8.1 % (ref 5–12)
NEUTROPHILS # BLD AUTO: 7.9 10*3/MM3 (ref 1.4–7)
NEUTROPHILS NFR BLD AUTO: 56 % (ref 42.7–76)
NRBC BLD AUTO-RTO: 0 /100 WBC (ref 0–0)
PLATELET # BLD AUTO: 277 10*3/MM3 (ref 140–450)
PMV BLD AUTO: 10.3 FL (ref 6–12)
RBC # BLD AUTO: 3.54 10*6/MM3 (ref 3.77–5.28)
RBC MORPH BLD: NORMAL
WBC MORPH BLD: NORMAL
WBC NRBC COR # BLD: 14.1 10*3/MM3 (ref 3.4–10.8)

## 2019-02-15 PROCEDURE — 85025 COMPLETE CBC W/AUTO DIFF WBC: CPT | Performed by: STUDENT IN AN ORGANIZED HEALTH CARE EDUCATION/TRAINING PROGRAM

## 2019-02-15 PROCEDURE — 85007 BL SMEAR W/DIFF WBC COUNT: CPT | Performed by: STUDENT IN AN ORGANIZED HEALTH CARE EDUCATION/TRAINING PROGRAM

## 2019-02-15 RX ORDER — KETOROLAC TROMETHAMINE 30 MG/ML
60 INJECTION, SOLUTION INTRAMUSCULAR; INTRAVENOUS ONCE
Status: DISCONTINUED | OUTPATIENT
Start: 2019-02-15 | End: 2019-02-15

## 2019-02-15 RX ORDER — IBUPROFEN 600 MG/1
600 TABLET ORAL EVERY 6 HOURS PRN
Qty: 60 TABLET | Refills: 12 | Status: SHIPPED | OUTPATIENT
Start: 2019-02-15 | End: 2019-02-15 | Stop reason: HOSPADM

## 2019-02-15 RX ORDER — IBUPROFEN 800 MG/1
800 TABLET ORAL EVERY 8 HOURS PRN
Qty: 90 TABLET | Refills: 11 | OUTPATIENT
Start: 2019-02-15 | End: 2019-12-14

## 2019-02-15 RX ADMIN — HYDROCODONE BITARTRATE AND ACETAMINOPHEN 1 TABLET: 5; 325 TABLET ORAL at 00:07

## 2019-02-15 RX ADMIN — HYDROCODONE BITARTRATE AND ACETAMINOPHEN 1 TABLET: 5; 325 TABLET ORAL at 09:23

## 2019-02-15 RX ADMIN — IBUPROFEN 600 MG: 600 TABLET ORAL at 05:15

## 2019-02-15 RX ADMIN — PRENATAL VIT W/ FE FUMARATE-FA TAB 27-0.8 MG 1 TABLET: 27-0.8 TAB at 09:24

## 2019-02-15 NOTE — PLAN OF CARE
Problem: Patient Care Overview  Goal: Discharge Needs Assessment  Outcome: Ongoing (interventions implemented as appropriate)   02/14/19 0416 02/14/19 0629   Discharge Needs Assessment   Readmission Within the Last 30 Days no previous admission in last 30 days --    Concerns to be Addressed no discharge needs identified --    Patient/Family Anticipates Transition to --  home with family   Patient/Family Anticipated Services at Transition none --    Transportation Concerns car, none --    Transportation Anticipated family or friend will provide --    Anticipated Changes Related to Illness none --    Offered/Gave Vendor List no --    Disability   Equipment Currently Used at Home --  none     Goal: Interprofessional Rounds/Family Conf  Outcome: Ongoing (interventions implemented as appropriate)      Problem: Skin Injury Risk (Adult)  Goal: Identify Related Risk Factors and Signs and Symptoms  Outcome: Ongoing (interventions implemented as appropriate)    Goal: Skin Health and Integrity  Outcome: Ongoing (interventions implemented as appropriate)      Problem: Postpartum (Vaginal Delivery) (Adult,Obstetrics,Pediatric)  Goal: Signs and Symptoms of Listed Potential Problems Will be Absent, Minimized or Managed (Postpartum)  Outcome: Ongoing (interventions implemented as appropriate)

## 2019-02-15 NOTE — PROGRESS NOTES
"[facility] Jeffery Hudson  : 2000  MRN: [id number]  CSN: [id number]    Postpartum Day #1  Subjective   Patient says she feels \"tired and sore\" but otherwise well. Pain well controlled. Ambulating well. Lochia minimal. Bottle feeding. Would like to start oral contraceptives and will discuss on outpatient follow up. She would like to go home today.     Objective     Min/max vitals past 24 hours:   Temp  Min: 97.4 °F (36.3 °C)  Max: 98.3 °F (36.8 °C)  BP  Min: 89/63  Max: 134/60  Pulse  Min: 80  Max: 116  Pulse  Min: 80  Max: 116        Abdomen: soft, non-tender; bowel sounds normal; no masses   fundus firm and non-tender   Calves: Nontender, no cords palpable   Pelvic: deferred     Lab Results   Component Value Date    WBC 14.10 (H) 02/15/2019    HGB 9.5 (L) 02/15/2019    HCT 29.6 (L) 02/15/2019    MCV 83.6 02/15/2019     02/15/2019    RH Positive 2019    HEPBSAG Negative 10/03/2018        Assessment   1. Postpartum Day #1 S/P vaginal delivery     Plan   1. Continue routine postpartum care  2. Discharge  3. Vaginal rest 6 weeks  4. Follow up outpatient with Dr. James in 2 weeks  5. All questions answered        David Resendiz M.D. PGY1  [facility] Family Medicine Residency  [address] [place] [address]  Office: [phone]    This document has been electronically signed by David Resendiz MD on February 15, 2019 6:31 AM            "

## 2019-02-15 NOTE — DISCHARGE SUMMARY
[facility] Jeffery Hudson  : 2000  MRN: [id number]  CSN: [id number]    Discharge Summary:    Date of Admission: 2019  Date of Discharge:  2/15/2019    Admitting Diagnosis:  1. IUP @ 40w2d  2. in labor     Discharge Diagnosis:  1. S/P        History and Hospital Course:  Patient is a   who presents in labor.  Her pregnancy was complicated by late prenatal care.       She was admitted and progressed in labor with pitocin augmentation and epidural analgesia to completely dilated. She had a vaginal delivery of a  viable female   infant who weighed 3320 g (7 lb 5.1 oz)  and APGARs of        APGARS  One minute Five minutes Ten minutes Fifteen minutes Twenty minutes   Skin color: 1   1             Heart rate: 2   2             Grimace: 2   2              Muscle tone: 2   2              Breathin   2              Totals: 9   9              . No immediate complications were encountered. Placenta delivered spontaneously and intact with three-vessel cord.     Her postpartum course has been unremarkable. She had no signs or symptoms of acute blood loss anemia. She was ambulating well, voiding without difficulty and lochia was within normal limits. She was bottle feeding without difficulty. She was stable for discharge on postpartum day 1.      Precautions and instructions were discussed with her including but not limited to maintaining a regular diet at home, practicing local hygiene, pelvic rest, and signs and symptoms to report including heavy bleeding, frequent passage of clots, fainting or dizziness, foul odor of lochia, increasing pain, fever, or any other concerns.    She was asked to follow up in the office in 2 weeks.      Discharge Diet: Regular  Discharge Activity: As tolerated. Vaginal rest 6 weeks.  Discharge Medications:     Discharge Medications      ASK your doctor about these medications      Instructions Start Date   hydrOXYzine pamoate 25 MG capsule  Commonly known  as:  VISTARIL   25 mg, Oral, 3 Times Daily PRN      MYNATAL PLUS tablet   1 tablet, Oral, Daily      omeprazole 20 MG capsule  Commonly known as:  PRILOSEC   20 mg, Oral, Daily           Patient will restart all home medications including prenatal vitamins.    Disposition to home in stable condition        David Resendiz M.D. PGY1  [facility] Medicine Residency  [address] [place] [address]  Office: [phone]    This document has been electronically signed by David Resendiz MD on February 15, 2019 11:02 AM

## 2019-02-16 NOTE — PLAN OF CARE
Problem: Patient Care Overview  Goal: Individualization and Mutuality  Outcome: Outcome(s) achieved Date Met: 02/15/19

## 2019-02-16 NOTE — PLAN OF CARE
Problem: Patient Care Overview  Goal: Plan of Care Review  Outcome: Outcome(s) achieved Date Met: 02/15/19

## 2019-02-16 NOTE — PLAN OF CARE
Problem: Patient Care Overview  Goal: Interprofessional Rounds/Family Conf  Outcome: Outcome(s) achieved Date Met: 02/15/19

## 2019-02-16 NOTE — PLAN OF CARE
Problem: Postpartum (Vaginal Delivery) (Adult,Obstetrics,Pediatric)  Goal: Signs and Symptoms of Listed Potential Problems Will be Absent, Minimized or Managed (Postpartum)  Outcome: Outcome(s) achieved Date Met: 02/15/19

## 2019-02-16 NOTE — PLAN OF CARE
Problem: Patient Care Overview  Goal: Discharge Needs Assessment  Outcome: Outcome(s) achieved Date Met: 02/15/19

## 2019-02-18 ENCOUNTER — TELEPHONE (OUTPATIENT)
Dept: OBSTETRICS AND GYNECOLOGY | Facility: CLINIC | Age: 19
End: 2019-02-18

## 2019-02-18 NOTE — TELEPHONE ENCOUNTER
----- Message from Kandace Carpio CNA sent at 2/18/2019  9:21 AM CST -----  Regarding: FW: [facility]       ----- Message -----  From: Adeline Azar RegSched Rep  Sent: 2/18/2019   8:43 AM  To: Kandace Carpio CNA  Subject: [facility]                              WHICH ONE SAID DR DAVID PRESCRIBED IBUPROPHEN 600  CALL [phone]     PATIENT : GALO MIRANDA [[id number]]

## 2019-02-18 NOTE — TELEPHONE ENCOUNTER
Spoke with Pharmacist and she said no one from there called. She is not sure where the message came from.

## 2019-02-23 NOTE — PROGRESS NOTES
CC: NELIDA visit     HPI: No c/o.  +FM  Denies any VB or cramping     PE - see vitals     A/P: 19 yo  @ 39w0d by 23w sonchristina presents for NELIDA visit.   1. Continue routine prenatal care  - Encourage PNV  - PTL warnings reviewed  - B+, RI, HIV neg, RPR NR, HBsAg neg, GC/CT neg/neg; HgbA1C wnl; GBS neg  - RTC in 1 weeks NELIDA with MD     2. Late Prenatal Care  - IG US Q4w   -BPP     3. Tobacco use     Karuna RIVASM

## 2019-03-15 VITALS — WEIGHT: 133 LBS | BODY MASS INDEX: 23.56 KG/M2 | DIASTOLIC BLOOD PRESSURE: 73 MMHG | SYSTOLIC BLOOD PRESSURE: 123 MMHG

## 2019-12-14 ENCOUNTER — APPOINTMENT (OUTPATIENT)
Dept: ULTRASOUND IMAGING | Facility: HOSPITAL | Age: 19
End: 2019-12-14

## 2019-12-14 ENCOUNTER — HOSPITAL ENCOUNTER (EMERGENCY)
Facility: HOSPITAL | Age: 19
Discharge: HOME | End: 2019-12-14
Attending: EMERGENCY MEDICINE | Admitting: EMERGENCY MEDICINE

## 2019-12-14 VITALS
DIASTOLIC BLOOD PRESSURE: 61 MMHG | HEIGHT: 63 IN | SYSTOLIC BLOOD PRESSURE: 103 MMHG | RESPIRATION RATE: 18 BRPM | BODY MASS INDEX: 17.65 KG/M2 | OXYGEN SATURATION: 97 % | HEART RATE: 88 BPM | TEMPERATURE: 98.6 F | WEIGHT: 99.6 LBS

## 2019-12-14 DIAGNOSIS — O20.0 THREATENED MISCARRIAGE: Primary | ICD-10-CM

## 2019-12-14 DIAGNOSIS — N39.0 ACUTE UTI: ICD-10-CM

## 2019-12-14 LAB
BACTERIA UR QL AUTO: ABNORMAL /HPF
BILIRUB UR QL STRIP: NEGATIVE
CLARITY UR: ABNORMAL
COLOR UR: YELLOW
GLUCOSE UR STRIP-MCNC: NEGATIVE MG/DL
HGB UR QL STRIP.AUTO: ABNORMAL
HYALINE CASTS UR QL AUTO: ABNORMAL /LPF
KETONES UR QL STRIP: NEGATIVE
LEUKOCYTE ESTERASE UR QL STRIP.AUTO: ABNORMAL
NITRITE UR QL STRIP: POSITIVE
PH UR STRIP.AUTO: 6.5 [PH] (ref 5–9)
PROT UR QL STRIP: NEGATIVE
RBC # UR: ABNORMAL /HPF
REF LAB TEST METHOD: ABNORMAL
SP GR UR STRIP: 1.01 (ref 1–1.03)
SQUAMOUS #/AREA URNS HPF: ABNORMAL /HPF
UROBILINOGEN UR QL STRIP: ABNORMAL
WBC UR QL AUTO: ABNORMAL /HPF

## 2019-12-14 PROCEDURE — 76817 TRANSVAGINAL US OBSTETRIC: CPT

## 2019-12-14 PROCEDURE — 81001 URINALYSIS AUTO W/SCOPE: CPT | Performed by: EMERGENCY MEDICINE

## 2019-12-14 PROCEDURE — 99284 EMERGENCY DEPT VISIT MOD MDM: CPT

## 2019-12-14 RX ORDER — CEFDINIR 300 MG/1
300 CAPSULE ORAL ONCE
Status: COMPLETED | OUTPATIENT
Start: 2019-12-14 | End: 2019-12-14

## 2019-12-14 RX ORDER — CEFDINIR 300 MG/1
300 CAPSULE ORAL 2 TIMES DAILY
Qty: 20 CAPSULE | Refills: 0 | Status: SHIPPED | OUTPATIENT
Start: 2019-12-14 | End: 2019-12-24

## 2019-12-14 RX ADMIN — CEFDINIR 300 MG: 300 CAPSULE ORAL at 04:38

## 2019-12-14 RX ADMIN — SODIUM CHLORIDE 1000 ML: 9 INJECTION, SOLUTION INTRAVENOUS at 02:52

## 2019-12-14 NOTE — ED NOTES
Per Gemma, nurse at [facility], pt presents with vaginal bleeding, ABD cramps and lower back pain. Pt is 6 weeks pregnant. Has not seen an OB MD yet. Per their MD vaginal exam, cervix is closed. Pt has had 3 successful pregnancies. Pt is coming POV with IV access      Mary Sellers, RN  12/14/19 0059       Mary Sellers, RN  12/14/19 0109

## 2019-12-14 NOTE — ED PROVIDER NOTES
Subjective   19-year-old white female presents to the emergency department with chief complaint of pregnancy with vaginal bleeding.  Patient relates she is 6 weeks pregnant with abdominal cramping and vaginal bleeding.  Patient was seen at [facility] emergency department and transferred here for pelvic ultrasound.  Patient's quantitative hCG is 2418 and blood type is B+.          Review of Systems   Constitutional: Negative for chills, diaphoresis and fever.   Respiratory: Negative for shortness of breath.    Cardiovascular: Negative for chest pain and leg swelling.   Gastrointestinal: Positive for abdominal pain. Negative for nausea and vomiting.   Genitourinary: Positive for vaginal bleeding. Negative for dysuria.   Musculoskeletal: Positive for back pain. Negative for gait problem and neck pain.   Neurological: Negative for syncope, weakness and headaches.   All other systems reviewed and are negative.      Past Medical History:   Diagnosis Date   • Acute pharyngitis    • Asthma    • Encounter for initial prescription of contraceptive pills    • Fever    • GERD (gastroesophageal reflux disease)    • History of MRSA infection    • Nausea        No Known Allergies    Past Surgical History:   Procedure Laterality Date   • ADENOIDECTOMY     • TONSILLECTOMY     • TONSILLECTOMY AND ADENOIDECTOMY     • TYMPANOSTOMY         Family History   Problem Relation Age of Onset   • Lung cancer Paternal Grandfather    • Breast cancer Neg Hx    • Uterine cancer Neg Hx    • Ovarian cancer Neg Hx    • Colon cancer Neg Hx        Social History     Socioeconomic History   • Marital status: Single     Spouse name: Not on file   • Number of children: Not on file   • Years of education: Not on file   • Highest education level: Not on file   Tobacco Use   • Smoking status: Current Every Day Smoker     Packs/day: 1.00     Types: Cigarettes   • Smokeless tobacco: Never Used   Substance and Sexual Activity   • Alcohol use: No   • Drug  use: No   • Sexual activity: Yes     Partners: Male     Birth control/protection: None           Objective   Physical Exam   Constitutional: She is oriented to person, place, and time. No distress.   HENT:   Head: Normocephalic and atraumatic.   Right Ear: External ear normal.   Left Ear: External ear normal.   Nose: Nose normal.   Mouth/Throat: Oropharynx is clear and moist. No oropharyngeal exudate.   Eyes: Pupils are equal, round, and reactive to light. Conjunctivae and EOM are normal.   Neck: Normal range of motion. Neck supple.   Cardiovascular: Normal rate, regular rhythm, normal heart sounds and intact distal pulses.   Pulmonary/Chest: Effort normal and breath sounds normal.   Abdominal: Soft. Bowel sounds are normal. She exhibits no distension and no mass. There is no tenderness. There is no guarding.   Musculoskeletal: Normal range of motion. She exhibits no edema or tenderness.   Neurological: She is alert and oriented to person, place, and time. She exhibits normal muscle tone.   Skin: Skin is warm and dry. She is not diaphoretic.   Psychiatric: She has a normal mood and affect. Her behavior is normal.   Nursing note and vitals reviewed.      Procedures           ED Course  ED Course as of Dec 14 0439   Sat Dec 14, 2019   0439 Patient is alert and resting comfortably. I reviewed the results of the emergency department evaluation with the patient.  I recommended OB follow-up.  I advised the patient to return to the emergency department if their symptoms change or worsen.     [DR]      ED Course User Index  [DR] René Barros MD      Labs Reviewed   URINALYSIS W/ MICROSCOPIC IF INDICATED (NO CULTURE) - Abnormal; Notable for the following components:       Result Value    Appearance, UA Cloudy (*)     Blood, UA Large (3+) (*)     Leuk Esterase, UA Moderate (2+) (*)     Nitrite, UA Positive (*)     All other components within normal limits   URINALYSIS, MICROSCOPIC ONLY - Abnormal; Notable for the following  components:    RBC, UA 0-2 (*)     WBC, UA 31-50 (*)     Bacteria, UA 3+ (*)     All other components within normal limits     Us Ob Transvaginal    Result Date: 12/14/2019  Narrative: Exam: OB Ultrasound INDICATION: Vaginal bleeding FINDINGS: An intrauterine gestational sac is present.. Fetal pole and yolk sac are seen. The gestational age based on a crown-rump length of 0.26 cm the five weeks six days. HORTENCIA 8/9/2012 Heart rate was 85 bpm. The right and left ovaries are unremarkable in appearance. The right ovary measures 2.57 x 2.6 to x 3.42 cm and the left ovary measures 3.14 x 0.95 x 3.36 cm. There may be a dilated tube on the left.     Impression: Single live intrauterine with gestational age equal to five weeks six days. Electronically signed by:  Zafar Mccall MD  12/14/2019 4:29 AM CST Workstation: [id number]                  No data recorded                        MDM    Final diagnoses:   Threatened miscarriage   Acute UTI              René Barros MD  12/14/19 0439

## 2020-08-10 ENCOUNTER — TELEPHONE (OUTPATIENT)
Dept: OBSTETRICS AND GYNECOLOGY | Facility: CLINIC | Age: 20
End: 2020-08-10

## 2020-08-10 NOTE — TELEPHONE ENCOUNTER
I called to remind patient of her appointment tomorrow. She did not answer. I left a message for her.

## 2020-09-16 ENCOUNTER — TELEPHONE (OUTPATIENT)
Dept: OBSTETRICS AND GYNECOLOGY | Facility: CLINIC | Age: 20
End: 2020-09-16

## 2020-09-16 NOTE — TELEPHONE ENCOUNTER
PATIENT CALLED TO SCHEDULE A NEW OB APPOINTMENT. SHE SAYS SHE HAS BEEN TRYING TO GET AN APPOINTMENT WITH US FOR MONTHS NOT ABLE TO GET IN..ADVISED SHE HAS NO SHOWED ALL HER SCHEDULED APPOINTMENTS...PT  SAID SHE WILL CALL BACK   01/02/2020/NO SHOW /NEW OB   06/30/2020/NO SHOW /NEW OB   08/11/2020/NO SHOW/ NEW OB

## 2020-09-30 ENCOUNTER — INITIAL PRENATAL (OUTPATIENT)
Dept: OBSTETRICS AND GYNECOLOGY | Facility: CLINIC | Age: 20
End: 2020-09-30

## 2020-09-30 ENCOUNTER — LAB (OUTPATIENT)
Dept: LAB | Facility: HOSPITAL | Age: 20
End: 2020-09-30

## 2020-09-30 VITALS — BODY MASS INDEX: 19.31 KG/M2 | WEIGHT: 109 LBS | SYSTOLIC BLOOD PRESSURE: 106 MMHG | DIASTOLIC BLOOD PRESSURE: 60 MMHG

## 2020-09-30 DIAGNOSIS — O99.330 TOBACCO USE DURING PREGNANCY, ANTEPARTUM: ICD-10-CM

## 2020-09-30 DIAGNOSIS — Z32.01 POSITIVE PREGNANCY TEST: Primary | ICD-10-CM

## 2020-09-30 DIAGNOSIS — O09.30 INSUFFICIENT ANTEPARTUM CARE: ICD-10-CM

## 2020-09-30 DIAGNOSIS — Z36.89 ENCOUNTER FOR FETAL ANATOMIC SURVEY: ICD-10-CM

## 2020-09-30 DIAGNOSIS — Z36.9 UNSPECIFIED ANTENATAL SCREENING: Primary | ICD-10-CM

## 2020-09-30 DIAGNOSIS — Z36.9 ANTENATAL SCREENING ENCOUNTER: ICD-10-CM

## 2020-09-30 DIAGNOSIS — O09.40 ENCOUNTER FOR SUPERVISION OF HIGH RISK PREGNANCY WITH GRAND MULTIPARITY, ANTEPARTUM: ICD-10-CM

## 2020-09-30 PROBLEM — O09.33 PRENATAL CARE INSUFFICIENT, THIRD TRIMESTER: Status: RESOLVED | Noted: 2019-01-02 | Resolved: 2020-09-30

## 2020-09-30 PROBLEM — O99.333 PREGNANCY COMPLICATED BY TOBACCO USE IN THIRD TRIMESTER: Status: RESOLVED | Noted: 2019-01-02 | Resolved: 2020-09-30

## 2020-09-30 LAB
ABO GROUP BLD: NORMAL
AMPHET+METHAMPHET UR QL: POSITIVE
AMPHETAMINES UR QL: POSITIVE
BACTERIA UR QL AUTO: ABNORMAL /HPF
BARBITURATES UR QL SCN: NEGATIVE
BASOPHILS # BLD AUTO: 0.05 10*3/MM3 (ref 0–0.2)
BASOPHILS NFR BLD AUTO: 0.5 % (ref 0–1.5)
BENZODIAZ UR QL SCN: NEGATIVE
BILIRUB UR QL STRIP: NEGATIVE
BLD GP AB SCN SERPL QL: NEGATIVE
BUPRENORPHINE SERPL-MCNC: NEGATIVE NG/ML
CANNABINOIDS SERPL QL: NEGATIVE
CLARITY UR: ABNORMAL
COCAINE UR QL: NEGATIVE
COLOR UR: YELLOW
DEPRECATED RDW RBC AUTO: 39.9 FL (ref 37–54)
EOSINOPHIL # BLD AUTO: 0.14 10*3/MM3 (ref 0–0.4)
EOSINOPHIL NFR BLD AUTO: 1.3 % (ref 0.3–6.2)
ERYTHROCYTE [DISTWIDTH] IN BLOOD BY AUTOMATED COUNT: 12.8 % (ref 12.3–15.4)
GLUCOSE UR STRIP-MCNC: NEGATIVE MG/DL
HBV SURFACE AG SERPL QL IA: NORMAL
HCT VFR BLD AUTO: 30 % (ref 34–46.6)
HCV AB SER DONR QL: NORMAL
HGB BLD-MCNC: 10.2 G/DL (ref 12–15.9)
HGB UR QL STRIP.AUTO: NEGATIVE
HIV1+2 AB SER QL: NORMAL
HYALINE CASTS UR QL AUTO: ABNORMAL /LPF
IMM GRANULOCYTES # BLD AUTO: 0.1 10*3/MM3 (ref 0–0.05)
IMM GRANULOCYTES NFR BLD AUTO: 0.9 % (ref 0–0.5)
KETONES UR QL STRIP: NEGATIVE
LEUKOCYTE ESTERASE UR QL STRIP.AUTO: ABNORMAL
LYMPHOCYTES # BLD AUTO: 2.72 10*3/MM3 (ref 0.7–3.1)
LYMPHOCYTES NFR BLD AUTO: 24.6 % (ref 19.6–45.3)
Lab: NORMAL
MCH RBC QN AUTO: 29.7 PG (ref 26.6–33)
MCHC RBC AUTO-ENTMCNC: 34 G/DL (ref 31.5–35.7)
MCV RBC AUTO: 87.2 FL (ref 79–97)
METHADONE UR QL SCN: NEGATIVE
MONOCYTES # BLD AUTO: 0.83 10*3/MM3 (ref 0.1–0.9)
MONOCYTES NFR BLD AUTO: 7.5 % (ref 5–12)
NEUTROPHILS NFR BLD AUTO: 65.2 % (ref 42.7–76)
NEUTROPHILS NFR BLD AUTO: 7.23 10*3/MM3 (ref 1.7–7)
NITRITE UR QL STRIP: NEGATIVE
NRBC BLD AUTO-RTO: 0 /100 WBC (ref 0–0.2)
OPIATES UR QL: NEGATIVE
OXYCODONE UR QL SCN: NEGATIVE
PCP UR QL SCN: NEGATIVE
PH UR STRIP.AUTO: 7 [PH] (ref 5–8)
PLATELET # BLD AUTO: 366 10*3/MM3 (ref 140–450)
PMV BLD AUTO: 10.8 FL (ref 6–12)
PROPOXYPH UR QL: NEGATIVE
PROT UR QL STRIP: ABNORMAL
RBC # BLD AUTO: 3.44 10*6/MM3 (ref 3.77–5.28)
RBC # UR: ABNORMAL /HPF
REF LAB TEST METHOD: ABNORMAL
RH BLD: POSITIVE
RPR SER QL: NORMAL
SP GR UR STRIP: 1.02 (ref 1–1.03)
SQUAMOUS #/AREA URNS HPF: ABNORMAL /HPF
TRICYCLICS UR QL SCN: NEGATIVE
UROBILINOGEN UR QL STRIP: ABNORMAL
WBC # BLD AUTO: 11.07 10*3/MM3 (ref 3.4–10.8)
WBC UR QL AUTO: ABNORMAL /HPF

## 2020-09-30 PROCEDURE — 87491 CHLMYD TRACH DNA AMP PROBE: CPT | Performed by: NURSE PRACTITIONER

## 2020-09-30 PROCEDURE — 81001 URINALYSIS AUTO W/SCOPE: CPT | Performed by: NURSE PRACTITIONER

## 2020-09-30 PROCEDURE — 86803 HEPATITIS C AB TEST: CPT | Performed by: NURSE PRACTITIONER

## 2020-09-30 PROCEDURE — G0480 DRUG TEST DEF 1-7 CLASSES: HCPCS

## 2020-09-30 PROCEDURE — 80081 OBSTETRIC PANEL INC HIV TSTG: CPT | Performed by: NURSE PRACTITIONER

## 2020-09-30 PROCEDURE — 36415 COLL VENOUS BLD VENIPUNCTURE: CPT | Performed by: NURSE PRACTITIONER

## 2020-09-30 PROCEDURE — 87661 TRICHOMONAS VAGINALIS AMPLIF: CPT | Performed by: NURSE PRACTITIONER

## 2020-09-30 PROCEDURE — 86900 BLOOD TYPING SEROLOGIC ABO: CPT | Performed by: NURSE PRACTITIONER

## 2020-09-30 PROCEDURE — 86850 RBC ANTIBODY SCREEN: CPT | Performed by: NURSE PRACTITIONER

## 2020-09-30 PROCEDURE — 86901 BLOOD TYPING SEROLOGIC RH(D): CPT | Performed by: NURSE PRACTITIONER

## 2020-09-30 PROCEDURE — 87086 URINE CULTURE/COLONY COUNT: CPT | Performed by: NURSE PRACTITIONER

## 2020-09-30 PROCEDURE — 87591 N.GONORRHOEAE DNA AMP PROB: CPT | Performed by: NURSE PRACTITIONER

## 2020-09-30 PROCEDURE — 80306 DRUG TEST PRSMV INSTRMNT: CPT | Performed by: NURSE PRACTITIONER

## 2020-09-30 PROCEDURE — 99214 OFFICE O/P EST MOD 30 MIN: CPT | Performed by: NURSE PRACTITIONER

## 2020-09-30 NOTE — PROGRESS NOTES
[facility]  Obstetrics Visit    CHIEF COMPLAINT:  New prenatal visit    HISTORY OF PRESENT ILLNESS:  Shanika Hudson is a 20 y.o. y/o  at Unknown by LMP (Patient's last menstrual period was 2020 (approximate).  This was not a planned pregnancy and the patient is supported by her boyfriend today. She denies any vaginal bleeding.  She has started taking a prenatal vitamin occassionally.    REVIEW OF SYSTEMS  Review of Systems   Constitutional: Negative for activity change, appetite change, chills, diaphoresis, fatigue, fever, unexpected weight gain and unexpected weight loss.   Respiratory: Negative for apnea, chest tightness and shortness of breath.    Cardiovascular: Negative for chest pain and palpitations.   Gastrointestinal: Negative for abdominal distention, abdominal pain, constipation and diarrhea.   Genitourinary: Negative for amenorrhea, breast discharge, breast lump, breast pain, decreased libido, decreased urine volume, difficulty urinating, dyspareunia, dysuria, flank pain, frequency, genital sores, hematuria, pelvic pain, pelvic pressure, urgency, urinary incontinence, vaginal bleeding, vaginal discharge and vaginal pain.   Musculoskeletal: Negative for myalgias.   Skin: Negative for color change, dry skin and skin lesions.   Neurological: Negative for light-headedness and headache.   Psychiatric/Behavioral: Negative for agitation, dysphoric mood, sleep disturbance, suicidal ideas, depressed mood and stress. The patient is not nervous/anxious.        PRENATAL RISK FACTORS   Problems (from 20 to present)     Problem Noted Resolved    Encounter for supervision of high risk pregnancy with grand multiparity, antepartum 2020 by Flora Cabrera APRN No    Insufficient antepartum care 2020 by Flora Cabrera APRN No    Tobacco use during pregnancy, antepartum 2020 by Flora Cabrera APRN No          DATING  CRITERIA:  LMP sometime in April    OBSTETRIC HISTORY:  OB History    Para Term  AB Living   5 3 3   1 3   SAB TAB Ectopic Molar Multiple Live Births   1       0 3      # Outcome Date GA Lbr Ta/2nd Weight Sex Delivery Anes PTL Lv   5 Current            4 SAB 19 7w0d          3 Term 19 40w2d 04:24 / 00:19 3320 g (7 lb 5.1 oz) F Vag-Spont EPI N JUAN RAMON   2 Term 10/05/17 40w0d 19:12 / 00:15 3402 g (7 lb 8 oz) F Vag-Spont EPI N JUAN RAMON   1 Term  40w0d  3062 g (6 lb 12 oz) M Vag-Spont   JUAN RAMON     GYN HISTORY:    Last pap smear:   Last Completed Pap Smear       Status Date      PAP SMEAR No completions recorded          PAST MEDICAL HISTORY:  Past Medical History:   Diagnosis Date   • Acute pharyngitis    • Asthma    • Encounter for initial prescription of contraceptive pills    • Fever    • GERD (gastroesophageal reflux disease)    • History of MRSA infection    • Nausea      PAST SURGICAL HISTORY:  Past Surgical History:   Procedure Laterality Date   • ADENOIDECTOMY     • TONSILLECTOMY     • TONSILLECTOMY AND ADENOIDECTOMY     • TYMPANOSTOMY       FAMILY HISTORY:  Family History   Problem Relation Age of Onset   • Lung cancer Paternal Grandfather    • Breast cancer Neg Hx    • Uterine cancer Neg Hx    • Ovarian cancer Neg Hx    • Colon cancer Neg Hx      SOCIAL HISTORY:  Social History     Socioeconomic History   • Marital status: Single     Spouse name: Not on file   • Number of children: Not on file   • Years of education: Not on file   • Highest education level: Not on file   Tobacco Use   • Smoking status: Current Every Day Smoker     Packs/day: 1.00     Types: Cigarettes   • Smokeless tobacco: Never Used   Substance and Sexual Activity   • Alcohol use: No   • Drug use: No   • Sexual activity: Yes     Partners: Male     Birth control/protection: None     GENETIC SCREENING:  Age >36 yo as of HORTENCIA: no  Thalassemia: no  NTD: no  CHD: no  Down Syndrome/MR/Fragile X/Autism: no  Ashkenazi Gnosticist with  Damir-Sachs, [place], familial dysautonomia: no  Sickle cell disease or trait: no  Hemophilia: no  Muscular dystrophy: no  Cystic fibrosis: no  Luciano's chorea: no  Birth defects: no  Genetic/chromosomal disorders: no    INFECTION HISTORY:  TB exposure: no  HSV: no  Illness since LMP: no  Prior GBS infected child: no  STIs: no    ALLERGIES:  No Known Allergies    MEDICATIONS:  Prior to Admission medications    Not on File       PHYSICAL EXAM:   /60   Wt 49.4 kg (109 lb)   LMP 2020 (Approximate)   BMI 19.31 kg/m²   General: Alert, healthy, no distress, well nourished and well developed.  Neurologic: Alert, oriented to person, place, and time.  Gait normal.  Cranial nerves II-XII grossly intact.  HEENT: Normocephalic, atraumatic.  Extraocular muscles intact, pupils equal and reactive x2.    Teeth: Normal hygiene.  Neck: Supple, no adenopathy, thyroid normal size, non-tender, without nodularity, trachea midline.  Breasts: No masses, skin dimpling, skin retraction, nipple discharge, or asymmetry bilaterally.  Lungs: Normal respiratory effort.  Clear to auscultation bilaterally.  No wheezes, rhonci, or rales.  Heart: Regular rate and rhythm.  No murmer, rub or gallop.  Abdomen: Soft, non-tender, non-distended,no masses, no hepatosplenomegaly, no hernia.  Skin: No rash, no lesions.  Extremities: No cyanosis, clubbing or edema.    Bedside ultrasound performed by myself which shows the findings below: decent size fetus probable third trimester or near it.      IMPRESSION:  Shanika Hudson is a 20 y.o.  at Unknown for a new prenatal visit.    PLAN:  1.  NOB  - Options counseling performed and patient desires continuation of pregnancy to term   - Prenatal labs ordered  - Genetic testing, including cystic fibrosis, was discussed and patient declines  - Continue prenatal vitamins and try to take daily  - Weight gain counseling performed.   - [place] BMI 18.5-24.9: Recommend 25-35 lb  -  Return to clinic in 1 weeks for return prenatal visit and anatomy/dating scan  - Reviewed COVID-19 visitation policy  - Reviewed COVID-19 precautions     Diagnosis Plan   1. Positive pregnancy test  OB Panel With HIV    Urine Drug Screen - Urine, Clean Catch    Urinalysis With Culture If Indicated - Urine, Clean Catch    RPR    CBC Auto Differential    Hepatitis B Surface Antigen    Rubella Antibody, IgG    OB Panel Type & Screen    HIV-1 / O / 2 Ag / Antibody 4th Generation    Hepatitis C Antibody    Hep B Confirmation Tube    CT/NG, TV, PCR - Urine, Urine, Clean Catch    Chlamydia trachomatis, Neisseria gonorrhoeae, PCR - Urine, Cervix    Trichomonas vaginalis, PCR - Urine, Urine, Clean Catch    PREVIOUS HISTORY   2. Encounter for supervision of high risk pregnancy with grand multiparity, antepartum     3. Insufficient antepartum care     4. Tobacco use during pregnancy, antepartum  Educated on risks in smoking in pregnancy, strongly encouraged complete smoking cessation   5.  screening encounter  OB Panel With HIV    Urine Drug Screen - Urine, Clean Catch    Urinalysis With Culture If Indicated - Urine, Clean Catch    RPR    CBC Auto Differential    Hepatitis B Surface Antigen    Rubella Antibody, IgG    OB Panel Type & Screen    HIV-1 / O / 2 Ag / Antibody 4th Generation    Hepatitis C Antibody    Hep B Confirmation Tube    CT/NG, TV, PCR - Urine, Urine, Clean Catch    Chlamydia trachomatis, Neisseria gonorrhoeae, PCR - Urine, Cervix    Trichomonas vaginalis, PCR - Urine, Urine, Clean Catch    PREVIOUS HISTORY   6. Encounter for fetal anatomic survey  US Ob 14 + Weeks Single or First Gestation     TRINA Dawkins  2020  16:01 CDT

## 2020-10-01 LAB
BACTERIA SPEC AEROBE CULT: NO GROWTH
HOLD SPECIMEN: NORMAL
RUBV IGG SERPL IA-ACNC: POSITIVE

## 2020-10-02 DIAGNOSIS — A59.01 TRICHOMONAL VAGINITIS: Primary | ICD-10-CM

## 2020-10-02 LAB
C TRACH RRNA SPEC QL NAA+PROBE: NEGATIVE
N GONORRHOEA RRNA SPEC QL NAA+PROBE: NEGATIVE
T VAGINALIS DNA SPEC QL NAA+PROBE: POSITIVE

## 2020-10-02 RX ORDER — METRONIDAZOLE 500 MG/1
2000 TABLET ORAL ONCE
Qty: 4 TABLET | Refills: 0 | Status: SHIPPED | OUTPATIENT
Start: 2020-10-02 | End: 2020-10-02

## 2020-10-05 LAB
AMPHET UR CFM-MCNC: 3204 NG/ML
AMPHET UR QL CFM: POSITIVE
AMPHETAMINES UR QL: POSITIVE
LABORATORY COMMENT REPORT: ABNORMAL
METHAMPHET UR CFM-MCNC: 5810 NG/ML
METHAMPHET UR QL CFM: POSITIVE

## 2020-10-07 ENCOUNTER — TELEPHONE (OUTPATIENT)
Dept: OBSTETRICS AND GYNECOLOGY | Facility: CLINIC | Age: 20
End: 2020-10-07

## 2020-10-08 DIAGNOSIS — A59.9 TRICHIMONIASIS: Primary | ICD-10-CM

## 2020-10-08 RX ORDER — METRONIDAZOLE 500 MG/1
2000 TABLET ORAL ONCE
Qty: 4 TABLET | Refills: 0 | Status: SHIPPED | OUTPATIENT
Start: 2020-10-08 | End: 2020-10-08

## 2020-11-11 ENCOUNTER — ROUTINE PRENATAL (OUTPATIENT)
Dept: OBSTETRICS AND GYNECOLOGY | Facility: CLINIC | Age: 20
End: 2020-11-11

## 2020-11-11 VITALS — SYSTOLIC BLOOD PRESSURE: 120 MMHG | BODY MASS INDEX: 20.19 KG/M2 | DIASTOLIC BLOOD PRESSURE: 72 MMHG | WEIGHT: 114 LBS

## 2020-11-11 DIAGNOSIS — O23.593 TRICHOMONAL VAGINITIS IN PREGNANCY IN THIRD TRIMESTER: ICD-10-CM

## 2020-11-11 DIAGNOSIS — O99.330 TOBACCO USE DURING PREGNANCY, ANTEPARTUM: ICD-10-CM

## 2020-11-11 DIAGNOSIS — Z36.9 ENCOUNTER FOR ANTENATAL SCREENING: ICD-10-CM

## 2020-11-11 DIAGNOSIS — O99.323 DRUG USE COMPLICATING PREGNANCY IN THIRD TRIMESTER: ICD-10-CM

## 2020-11-11 DIAGNOSIS — O99.013 ANEMIA OF MOTHER IN PREGNANCY, ANTEPARTUM, THIRD TRIMESTER: ICD-10-CM

## 2020-11-11 DIAGNOSIS — A59.01 TRICHOMONAL VAGINITIS IN PREGNANCY IN THIRD TRIMESTER: ICD-10-CM

## 2020-11-11 DIAGNOSIS — Z36.87 ENCOUNTER FOR ANTENATAL SCREENING FOR UNCERTAIN DATES: ICD-10-CM

## 2020-11-11 DIAGNOSIS — O09.30 INSUFFICIENT ANTEPARTUM CARE: ICD-10-CM

## 2020-11-11 DIAGNOSIS — O09.93 HIGH-RISK PREGNANCY IN THIRD TRIMESTER: Primary | ICD-10-CM

## 2020-11-11 DIAGNOSIS — Z36.89 ENCOUNTER FOR FETAL ANATOMIC SURVEY: ICD-10-CM

## 2020-11-11 PROCEDURE — 99213 OFFICE O/P EST LOW 20 MIN: CPT | Performed by: NURSE PRACTITIONER

## 2020-11-11 RX ORDER — PRENATAL VIT/IRON FUM/FOLIC AC 27 MG-1 MG
1 TABLET ORAL DAILY
Qty: 90 EACH | Refills: 4 | Status: SHIPPED | OUTPATIENT
Start: 2020-11-11 | End: 2020-12-11

## 2020-11-11 RX ORDER — FERROUS SULFATE 325(65) MG
325 TABLET ORAL
Qty: 90 TABLET | Refills: 3 | Status: ON HOLD | OUTPATIENT
Start: 2020-11-11 | End: 2020-12-13

## 2020-11-12 NOTE — PROGRESS NOTES
CC: Prenatal visit    Shanika Hudson is a 20 y.o.  at Unknown.  Doing well.  No complaints.  Denies contractions, LOF, or VB.  Reports good FM.    /72   Wt 51.7 kg (114 lb)   LMP 2020 (Within Weeks)   BMI 20.19 kg/m²     US dating prelim- twin intrauterine pregnancy  Fetus A: measuring 30w5d  Fetus B: measuring 29w4d  LMP unknown- pt needs to see MFM next week for dating recomendation     Fetal Heart Rate: 134/132     Problems (from 20 to present)     Problem Noted Resolved    Encounter for supervision of high risk pregnancy with grand multiparity, antepartum 2020 by Flora Cabrera APRN No    Insufficient antepartum care 2020 by Flora Cabrera APRN No    Tobacco use during pregnancy, antepartum 2020 by Flora Cabrera APRN No          A/P: Shanika Hudson is a 20 y.o.  at Unknown.  In the past, pt declines drug use.  Pt admits to meth and drug use during her pregnancy, but reports she quit recently.  We had a long discussion of risks of elicit drug use in pregnancy.  Pt not interested in drug rehabiltation programs.  She reports she can quit on her own.  Told her babies will have to go to NICU when delivered due to her drug use.  Pt verbalizes understanding.      We do not have accurate LMP on pt, so plan to get MFM scan and determine HORTENCIA   Discussed 1 hour ogtt and labs   Pt desires BTL, discussed R&Bs, signed paperwork today  - RTC in 1 weeks for MFM anatomy scan and OB appt     Diagnosis Plan   1. High-risk pregnancy in third trimester  US Ob Limited 1 + Fetuses    US Fetal Biophysical Profile;With Non-Stress Testing    US Ob 14 + Weeks Each Additional Gestation   2. Insufficient antepartum care  US Ob Limited 1 + Fetuses    US Fetal Biophysical Profile;With Non-Stress Testing    US Ob 14 + Weeks Each Additional Gestation   3. Drug use complicating pregnancy in third trimester  US Ob Limited 1 +  Fetuses    Urine Drug Screen - Urine, Clean Catch    US Fetal Biophysical Profile;With Non-Stress Testing    US Ob 14 + Weeks Each Additional Gestation   4. Tobacco use during pregnancy, antepartum  US Fetal Biophysical Profile;With Non-Stress Testing    US Ob 14 + Weeks Each Additional Gestation   5. Anemia of mother in pregnancy, antepartum, third trimester  ferrous sulfate 325 (65 FE) MG tablet    US Ob 14 + Weeks Each Additional Gestation   6. Trichomonal vaginitis in pregnancy in third trimester  CT/NG, TV, PCR - Urine, Urine, Clean Catch   7. Encounter for  screening for uncertain dates  US Ob Limited 1 + Fetuses   8. Encounter for  screening  CBC (No Diff)    Glucose, Post 50 Gm Glucola   9. Encounter for fetal anatomic survey  US Ob 14 + Weeks Each Additional Gestation       TRINA Dawkins  2020  13:20 CST

## 2020-12-13 ENCOUNTER — ANESTHESIA (OUTPATIENT)
Dept: LABOR AND DELIVERY | Facility: HOSPITAL | Age: 20
End: 2020-12-13

## 2020-12-13 ENCOUNTER — ANESTHESIA EVENT (OUTPATIENT)
Dept: LABOR AND DELIVERY | Facility: HOSPITAL | Age: 20
End: 2020-12-13

## 2020-12-13 ENCOUNTER — HOSPITAL ENCOUNTER (INPATIENT)
Facility: HOSPITAL | Age: 20
LOS: 3 days | Discharge: HOME | End: 2020-12-16
Attending: OBSTETRICS & GYNECOLOGY | Admitting: OBSTETRICS & GYNECOLOGY

## 2020-12-13 DIAGNOSIS — O32.9XX0 MALPRESENTATION BEFORE ONSET OF LABOR, SINGLE OR UNSPECIFIED FETUS: ICD-10-CM

## 2020-12-13 DIAGNOSIS — F19.10 DRUG ABUSE (HCC): ICD-10-CM

## 2020-12-13 DIAGNOSIS — O30.003 TWIN GESTATION IN THIRD TRIMESTER, UNSPECIFIED MULTIPLE GESTATION TYPE: ICD-10-CM

## 2020-12-13 DIAGNOSIS — Z98.891 STATUS POST PRIMARY LOW TRANSVERSE CESAREAN SECTION: Primary | ICD-10-CM

## 2020-12-13 LAB
ABO GROUP BLD: NORMAL
AMPHET+METHAMPHET UR QL: POSITIVE
AMPHETAMINES UR QL: POSITIVE
ANTI-S: NORMAL
BARBITURATES UR QL SCN: NEGATIVE
BENZODIAZ UR QL SCN: NEGATIVE
BIG S ANTIGEN: NEGATIVE
BLD GP AB SCN SERPL QL: POSITIVE
BUPRENORPHINE SERPL-MCNC: NEGATIVE NG/ML
CANNABINOIDS SERPL QL: NEGATIVE
COCAINE UR QL: NEGATIVE
DEPRECATED RDW RBC AUTO: 43.4 FL (ref 37–54)
ERYTHROCYTE [DISTWIDTH] IN BLOOD BY AUTOMATED COUNT: 14.9 % (ref 12.3–15.4)
HCT VFR BLD AUTO: 27.4 % (ref 34–46.6)
HGB BLD-MCNC: 8.3 G/DL (ref 12–15.9)
Lab: NORMAL
MCH RBC QN AUTO: 24.3 PG (ref 26.6–33)
MCHC RBC AUTO-ENTMCNC: 30.3 G/DL (ref 31.5–35.7)
MCV RBC AUTO: 80.1 FL (ref 79–97)
METHADONE UR QL SCN: NEGATIVE
OPIATES UR QL: NEGATIVE
OXYCODONE UR QL SCN: NEGATIVE
PCP UR QL SCN: NEGATIVE
PLATELET # BLD AUTO: 315 10*3/MM3 (ref 140–450)
PMV BLD AUTO: 11.4 FL (ref 6–12)
PROPOXYPH UR QL: NEGATIVE
RBC # BLD AUTO: 3.42 10*6/MM3 (ref 3.77–5.28)
RH BLD: POSITIVE
T&S EXPIRATION DATE: NORMAL
TRICYCLICS UR QL SCN: NEGATIVE
WBC # BLD AUTO: 17.25 10*3/MM3 (ref 3.4–10.8)

## 2020-12-13 PROCEDURE — 86900 BLOOD TYPING SEROLOGIC ABO: CPT | Performed by: OBSTETRICS & GYNECOLOGY

## 2020-12-13 PROCEDURE — 58611 LIGATE OVIDUCT(S) ADD-ON: CPT | Performed by: OBSTETRICS & GYNECOLOGY

## 2020-12-13 PROCEDURE — 25010000002 CEFAZOLIN PER 500 MG: Performed by: OBSTETRICS & GYNECOLOGY

## 2020-12-13 PROCEDURE — G0480 DRUG TEST DEF 1-7 CLASSES: HCPCS | Performed by: OBSTETRICS & GYNECOLOGY

## 2020-12-13 PROCEDURE — 25010000002 PHENYLEPHRINE PER 1 ML: Performed by: NURSE ANESTHETIST, CERTIFIED REGISTERED

## 2020-12-13 PROCEDURE — 80306 DRUG TEST PRSMV INSTRMNT: CPT | Performed by: OBSTETRICS & GYNECOLOGY

## 2020-12-13 PROCEDURE — S0260 H&P FOR SURGERY: HCPCS | Performed by: OBSTETRICS & GYNECOLOGY

## 2020-12-13 PROCEDURE — 25010000002 PROPOFOL 10 MG/ML EMULSION: Performed by: NURSE ANESTHETIST, CERTIFIED REGISTERED

## 2020-12-13 PROCEDURE — 51703 INSERT BLADDER CATH COMPLEX: CPT

## 2020-12-13 PROCEDURE — 25010000002 KETOROLAC TROMETHAMINE PER 15 MG: Performed by: OBSTETRICS & GYNECOLOGY

## 2020-12-13 PROCEDURE — 88307 TISSUE EXAM BY PATHOLOGIST: CPT

## 2020-12-13 PROCEDURE — 86901 BLOOD TYPING SEROLOGIC RH(D): CPT | Performed by: OBSTETRICS & GYNECOLOGY

## 2020-12-13 PROCEDURE — 58611 LIGATE OVIDUCT(S) ADD-ON: CPT | Performed by: SPECIALIST/TECHNOLOGIST, OTHER

## 2020-12-13 PROCEDURE — 88302 TISSUE EXAM BY PATHOLOGIST: CPT

## 2020-12-13 PROCEDURE — 86870 RBC ANTIBODY IDENTIFICATION: CPT | Performed by: OBSTETRICS & GYNECOLOGY

## 2020-12-13 PROCEDURE — 25010000002 MIDAZOLAM PER 1 MG: Performed by: NURSE ANESTHETIST, CERTIFIED REGISTERED

## 2020-12-13 PROCEDURE — 59515 CESAREAN DELIVERY: CPT | Performed by: OBSTETRICS & GYNECOLOGY

## 2020-12-13 PROCEDURE — 25010000002 MORPHINE PER 10 MG: Performed by: NURSE ANESTHETIST, CERTIFIED REGISTERED

## 2020-12-13 PROCEDURE — 86905 BLOOD TYPING RBC ANTIGENS: CPT | Performed by: OBSTETRICS & GYNECOLOGY

## 2020-12-13 PROCEDURE — 0UB70ZZ EXCISION OF BILATERAL FALLOPIAN TUBES, OPEN APPROACH: ICD-10-PCS | Performed by: OBSTETRICS & GYNECOLOGY

## 2020-12-13 PROCEDURE — 86850 RBC ANTIBODY SCREEN: CPT | Performed by: OBSTETRICS & GYNECOLOGY

## 2020-12-13 PROCEDURE — 85027 COMPLETE CBC AUTOMATED: CPT | Performed by: OBSTETRICS & GYNECOLOGY

## 2020-12-13 PROCEDURE — 59514 CESAREAN DELIVERY ONLY: CPT | Performed by: SPECIALIST/TECHNOLOGIST, OTHER

## 2020-12-13 RX ORDER — OXYCODONE HYDROCHLORIDE 5 MG/1
10 TABLET ORAL EVERY 4 HOURS PRN
Status: DISCONTINUED | OUTPATIENT
Start: 2020-12-13 | End: 2020-12-16 | Stop reason: HOSPADM

## 2020-12-13 RX ORDER — KETOROLAC TROMETHAMINE 30 MG/ML
30 INJECTION, SOLUTION INTRAMUSCULAR; INTRAVENOUS EVERY 6 HOURS
Status: DISPENSED | OUTPATIENT
Start: 2020-12-13 | End: 2020-12-14

## 2020-12-13 RX ORDER — LANOLIN 100 %
OINTMENT (GRAM) TOPICAL
Status: DISCONTINUED | OUTPATIENT
Start: 2020-12-13 | End: 2020-12-16 | Stop reason: HOSPADM

## 2020-12-13 RX ORDER — MISOPROSTOL 200 UG/1
800 TABLET ORAL AS NEEDED
Status: DISCONTINUED | OUTPATIENT
Start: 2020-12-13 | End: 2020-12-13 | Stop reason: HOSPADM

## 2020-12-13 RX ORDER — HYDROCORTISONE 25 MG/G
CREAM TOPICAL 3 TIMES DAILY PRN
Status: DISCONTINUED | OUTPATIENT
Start: 2020-12-13 | End: 2020-12-16 | Stop reason: HOSPADM

## 2020-12-13 RX ORDER — BUPIVACAINE HCL/0.9 % NACL/PF 0.1 %
2 PLASTIC BAG, INJECTION (ML) EPIDURAL EVERY 8 HOURS
Status: COMPLETED | OUTPATIENT
Start: 2020-12-13 | End: 2020-12-14

## 2020-12-13 RX ORDER — DIPHENHYDRAMINE HCL 25 MG
25 CAPSULE ORAL EVERY 4 HOURS PRN
Status: DISCONTINUED | OUTPATIENT
Start: 2020-12-13 | End: 2020-12-16 | Stop reason: HOSPADM

## 2020-12-13 RX ORDER — PROPOFOL 10 MG/ML
VIAL (ML) INTRAVENOUS AS NEEDED
Status: DISCONTINUED | OUTPATIENT
Start: 2020-12-13 | End: 2020-12-13 | Stop reason: SURG

## 2020-12-13 RX ORDER — OXYTOCIN/0.9 % SODIUM CHLORIDE 30/500 ML
650 PLASTIC BAG, INJECTION (ML) INTRAVENOUS ONCE
Status: CANCELLED | OUTPATIENT
Start: 2020-12-13 | End: 2020-12-13

## 2020-12-13 RX ORDER — SODIUM CHLORIDE 0.9 % (FLUSH) 0.9 %
10 SYRINGE (ML) INJECTION AS NEEDED
Status: DISCONTINUED | OUTPATIENT
Start: 2020-12-13 | End: 2020-12-13

## 2020-12-13 RX ORDER — CARBOPROST TROMETHAMINE 250 UG/ML
250 INJECTION, SOLUTION INTRAMUSCULAR AS NEEDED
Status: DISCONTINUED | OUTPATIENT
Start: 2020-12-13 | End: 2020-12-13 | Stop reason: HOSPADM

## 2020-12-13 RX ORDER — NALOXONE HCL 0.4 MG/ML
0.2 VIAL (ML) INJECTION
Status: DISCONTINUED | OUTPATIENT
Start: 2020-12-13 | End: 2020-12-16 | Stop reason: HOSPADM

## 2020-12-13 RX ORDER — OXYTOCIN/0.9 % SODIUM CHLORIDE 30/500 ML
PLASTIC BAG, INJECTION (ML) INTRAVENOUS AS NEEDED
Status: DISCONTINUED | OUTPATIENT
Start: 2020-12-13 | End: 2020-12-13 | Stop reason: SURG

## 2020-12-13 RX ORDER — SODIUM CHLORIDE, SODIUM LACTATE, POTASSIUM CHLORIDE, CALCIUM CHLORIDE 600; 310; 30; 20 MG/100ML; MG/100ML; MG/100ML; MG/100ML
125 INJECTION, SOLUTION INTRAVENOUS CONTINUOUS
Status: DISCONTINUED | OUTPATIENT
Start: 2020-12-13 | End: 2020-12-13 | Stop reason: HOSPADM

## 2020-12-13 RX ORDER — BUPIVACAINE HYDROCHLORIDE 7.5 MG/ML
INJECTION, SOLUTION EPIDURAL; RETROBULBAR AS NEEDED
Status: DISCONTINUED | OUTPATIENT
Start: 2020-12-13 | End: 2020-12-13 | Stop reason: SURG

## 2020-12-13 RX ORDER — LIDOCAINE HYDROCHLORIDE 10 MG/ML
5 INJECTION, SOLUTION EPIDURAL; INFILTRATION; INTRACAUDAL; PERINEURAL AS NEEDED
Status: DISCONTINUED | OUTPATIENT
Start: 2020-12-13 | End: 2020-12-13

## 2020-12-13 RX ORDER — ALUMINA, MAGNESIA, AND SIMETHICONE 2400; 2400; 240 MG/30ML; MG/30ML; MG/30ML
15 SUSPENSION ORAL EVERY 4 HOURS PRN
Status: DISCONTINUED | OUTPATIENT
Start: 2020-12-13 | End: 2020-12-16 | Stop reason: HOSPADM

## 2020-12-13 RX ORDER — OXYTOCIN/0.9 % SODIUM CHLORIDE 30/500 ML
650 PLASTIC BAG, INJECTION (ML) INTRAVENOUS ONCE
Status: DISCONTINUED | OUTPATIENT
Start: 2020-12-13 | End: 2020-12-13 | Stop reason: HOSPADM

## 2020-12-13 RX ORDER — ACETAMINOPHEN 500 MG
1000 TABLET ORAL EVERY 8 HOURS
Status: DISCONTINUED | OUTPATIENT
Start: 2020-12-13 | End: 2020-12-16 | Stop reason: HOSPADM

## 2020-12-13 RX ORDER — DIPHENHYDRAMINE HYDROCHLORIDE 50 MG/ML
25 INJECTION INTRAMUSCULAR; INTRAVENOUS EVERY 4 HOURS PRN
Status: DISCONTINUED | OUTPATIENT
Start: 2020-12-13 | End: 2020-12-16 | Stop reason: HOSPADM

## 2020-12-13 RX ORDER — OXYTOCIN/0.9 % SODIUM CHLORIDE 30/500 ML
85 PLASTIC BAG, INJECTION (ML) INTRAVENOUS ONCE
Status: DISCONTINUED | OUTPATIENT
Start: 2020-12-13 | End: 2020-12-13 | Stop reason: HOSPADM

## 2020-12-13 RX ORDER — ONDANSETRON 2 MG/ML
4 INJECTION INTRAMUSCULAR; INTRAVENOUS ONCE AS NEEDED
Status: ACTIVE | OUTPATIENT
Start: 2020-12-13 | End: 2020-12-14

## 2020-12-13 RX ORDER — TRISODIUM CITRATE DIHYDRATE AND CITRIC ACID MONOHYDRATE 500; 334 MG/5ML; MG/5ML
30 SOLUTION ORAL ONCE
Status: COMPLETED | OUTPATIENT
Start: 2020-12-13 | End: 2020-12-13

## 2020-12-13 RX ORDER — SIMETHICONE 80 MG
80 TABLET,CHEWABLE ORAL 4 TIMES DAILY PRN
Status: DISCONTINUED | OUTPATIENT
Start: 2020-12-13 | End: 2020-12-16 | Stop reason: HOSPADM

## 2020-12-13 RX ORDER — BUPIVACAINE HCL/0.9 % NACL/PF 0.1 %
2 PLASTIC BAG, INJECTION (ML) EPIDURAL ONCE
Status: COMPLETED | OUTPATIENT
Start: 2020-12-13 | End: 2020-12-13

## 2020-12-13 RX ORDER — IBUPROFEN 800 MG/1
800 TABLET ORAL EVERY 8 HOURS SCHEDULED
Status: DISCONTINUED | OUTPATIENT
Start: 2020-12-14 | End: 2020-12-16 | Stop reason: HOSPADM

## 2020-12-13 RX ORDER — BUPIVACAINE HYDROCHLORIDE 7.5 MG/ML
INJECTION, SOLUTION EPIDURAL; RETROBULBAR
Status: COMPLETED | OUTPATIENT
Start: 2020-12-13 | End: 2020-12-13

## 2020-12-13 RX ORDER — DIPHENHYDRAMINE HCL 25 MG
25 CAPSULE ORAL EVERY 4 HOURS PRN
Status: DISCONTINUED | OUTPATIENT
Start: 2020-12-13 | End: 2020-12-13 | Stop reason: SDUPTHER

## 2020-12-13 RX ORDER — METHYLERGONOVINE MALEATE 0.2 MG/ML
200 INJECTION INTRAVENOUS ONCE AS NEEDED
Status: DISCONTINUED | OUTPATIENT
Start: 2020-12-13 | End: 2020-12-13 | Stop reason: HOSPADM

## 2020-12-13 RX ORDER — OXYTOCIN/0.9 % SODIUM CHLORIDE 30/500 ML
PLASTIC BAG, INJECTION (ML) INTRAVENOUS
Status: COMPLETED
Start: 2020-12-13 | End: 2020-12-13

## 2020-12-13 RX ORDER — MIDAZOLAM HYDROCHLORIDE 1 MG/ML
INJECTION INTRAMUSCULAR; INTRAVENOUS AS NEEDED
Status: DISCONTINUED | OUTPATIENT
Start: 2020-12-13 | End: 2020-12-13 | Stop reason: SURG

## 2020-12-13 RX ORDER — SODIUM CHLORIDE, SODIUM LACTATE, POTASSIUM CHLORIDE, CALCIUM CHLORIDE 600; 310; 30; 20 MG/100ML; MG/100ML; MG/100ML; MG/100ML
INJECTION, SOLUTION INTRAVENOUS
Status: COMPLETED
Start: 2020-12-13 | End: 2020-12-13

## 2020-12-13 RX ORDER — PRENATAL VIT/IRON FUM/FOLIC AC 27MG-0.8MG
1 TABLET ORAL DAILY
Status: DISCONTINUED | OUTPATIENT
Start: 2020-12-13 | End: 2020-12-16 | Stop reason: HOSPADM

## 2020-12-13 RX ORDER — OXYTOCIN/0.9 % SODIUM CHLORIDE 30/500 ML
85 PLASTIC BAG, INJECTION (ML) INTRAVENOUS ONCE
Status: CANCELLED | OUTPATIENT
Start: 2020-12-13 | End: 2020-12-13

## 2020-12-13 RX ORDER — MORPHINE SULFATE 1 MG/ML
INJECTION, SOLUTION EPIDURAL; INTRATHECAL; INTRAVENOUS AS NEEDED
Status: DISCONTINUED | OUTPATIENT
Start: 2020-12-13 | End: 2020-12-13 | Stop reason: SURG

## 2020-12-13 RX ADMIN — PROPOFOL 50 MG: 10 INJECTION, EMULSION INTRAVENOUS at 09:36

## 2020-12-13 RX ADMIN — PRENATAL VIT W/ FE FUMARATE-FA TAB 27-0.8 MG 1 TABLET: 27-0.8 TAB at 16:50

## 2020-12-13 RX ADMIN — PHENYLEPHRINE HYDROCHLORIDE 100 MCG: 10 INJECTION INTRAVENOUS at 09:01

## 2020-12-13 RX ADMIN — KETOROLAC TROMETHAMINE 30 MG: 30 INJECTION, SOLUTION INTRAMUSCULAR; INTRAVENOUS at 16:51

## 2020-12-13 RX ADMIN — BUPIVACAINE HYDROCHLORIDE 1.6 ML: 7.5 INJECTION, SOLUTION EPIDURAL; RETROBULBAR at 08:58

## 2020-12-13 RX ADMIN — SODIUM CHLORIDE, POTASSIUM CHLORIDE, SODIUM LACTATE AND CALCIUM CHLORIDE 1000 ML: 600; 310; 30; 20 INJECTION, SOLUTION INTRAVENOUS at 09:47

## 2020-12-13 RX ADMIN — BUPIVACAINE HYDROCHLORIDE 1.6 ML: 7.5 INJECTION, SOLUTION EPIDURAL; RETROBULBAR at 08:57

## 2020-12-13 RX ADMIN — MIDAZOLAM HYDROCHLORIDE 5 MG: 2 INJECTION, SOLUTION INTRAMUSCULAR; INTRAVENOUS at 09:16

## 2020-12-13 RX ADMIN — SODIUM CHLORIDE, POTASSIUM CHLORIDE, SODIUM LACTATE AND CALCIUM CHLORIDE 500 ML: 600; 310; 30; 20 INJECTION, SOLUTION INTRAVENOUS at 09:10

## 2020-12-13 RX ADMIN — OXYTOCIN-SODIUM CHLORIDE 0.9% IV SOLN 30 UNIT/500ML 30 UNITS: 30-0.9/5 SOLUTION at 11:20

## 2020-12-13 RX ADMIN — PROPOFOL 50 MG: 10 INJECTION, EMULSION INTRAVENOUS at 09:16

## 2020-12-13 RX ADMIN — Medication 0.2 MG: at 08:57

## 2020-12-13 RX ADMIN — CEFAZOLIN SODIUM 2 G: 10 INJECTION, POWDER, FOR SOLUTION INTRAVENOUS at 17:29

## 2020-12-13 RX ADMIN — Medication 2 G: at 09:01

## 2020-12-13 RX ADMIN — ACETAMINOPHEN 1000 MG: 500 TABLET ORAL at 16:50

## 2020-12-13 RX ADMIN — SODIUM CITRATE AND CITRIC ACID MONOHYDRATE 30 ML: 500; 334 SOLUTION ORAL at 08:17

## 2020-12-13 RX ADMIN — OXYTOCIN-SODIUM CHLORIDE 0.9% IV SOLN 30 UNIT/500ML 250 ML: 30-0.9/5 SOLUTION at 09:16

## 2020-12-13 NOTE — H&P
[facility]  Obstetric History and Physical    Chief complaint my stomach hurts        Patient is a 20 y.o. female  currently at Unknown, who presents with complaint of worsening abdominal pain over the last several hours.  Pain comes and goes and likely represents contractions.  She states positive fetal movement, denies loss of fluid or vaginal bleeding at this time.  Bedside ultrasound revealed both fetuses to be in the breech presentation.  Discussed with patient  section, patient does desire tubal ligation at the same time.  Patient had had minimal prenatal care no anatomy scan done and no Glucola done.  Anticipate fetus is to be approximately 36 weeks at this point but exact due date is not clear at this time.  Patient has signed tubal paperwork and still desires to have a tubal ligation.    Her prenatal care is complicated by di-ditwin gestation, insufficient prenatal care, tobacco use during pregnancy, undesired future fertility.    The risks, benefits. and alternatives to  section with bilateral tubal ligation were discussed.  The risks that were discussed included, but were not limited to, pain, infection, bleeding, hemorrhage,  injury to the bowel, bladder, uterus, tubes, ovaries, or baby which could require further surgery or prolonged hospitalization.  Remote possibility of hysterectomy and/or salpingo-oophorectomy. Remote possibility of death of baby and/or mother. I discussed the risk of bleeding with the patient and possible risk of blood transfusion.  Blood products carry risk of HIV, infection, hepatitis, and transfusion reaction. Patient is willing to accept blood products. The patient understands that SCD's will be placed on her legs to try and reduce the risk of clot formation in her legs.  She understands that we will have early ambulation to also reduce the risk of blood clot formation and to reduce the risk of pneumonia.  She will be given antibiotics prior to her  surgery to help decrease the risk for infection.  All questions were answered. Patient express understanding and desires to proceed with surgery.     Obstetric History   # 1 - Date: 2015, Sex: Male, Weight: 3062 g (6 lb 12 oz), GA: 40w0d, Delivery: Vaginal, Spontaneous, Apgar1: None, Apgar5: None, Living: Living, Birth Comments: None    # 2 - Date: 10/05/17, Sex: Female, Weight: 3402 g (7 lb 8 oz), GA: 40w0d, Delivery: Vaginal, Spontaneous, Apgar1: 8, Apgar5: 9, Living: Living, Birth Comments: None    # 3 - Date: 02/14/19, Sex: Female, Weight: 3320 g (7 lb 5.1 oz), GA: 40w2d, Delivery: Vaginal, Spontaneous, Apgar1: 9, Apgar5: 9, Living: Living, Birth Comments: None    # 4 - Date: 12/21/19, Sex: None, Weight: None, GA: 7w0d, Delivery: None, Apgar1: None, Apgar5: None, Living: None, Birth Comments: None    # 5 - Date: None, Sex: None, Weight: None, GA: None, Delivery: None, Apgar1: None, Apgar5: None, Living: None, Birth Comments: None       The following portions of the patients history were reviewed and updated as appropriate: current medications, allergies, past medical history, past surgical history, past family history, past social history and problem list .       Prenatal Information:  Prenatal Results    The patient does not have a working HORTENCIA. Some results will not display without a working HORTENCIA.   POC Urine Glucose/Protein     Test Value Reference Range Date Time    Urine Glucose        Urine Protein              Initial Prenatal Labs     Test Value Reference Range Date Time    Hemoglobin        Hematocrit        Platelets        Rubella IgG        Hepatitis B SAg        Hepatitis C Ab        RPR        ABO B   09/30/20 1343    Rh Positive   09/30/20 1343    Antibody Screen        HIV        Urine Culture        Gonorrhea        Chlamydia        TSH              2nd and 3rd Trimester     Test Value Reference Range Date Time    Hemoglobin (repeated)        Hematocrit (repeated)        GCT        Antibody Screen  (repeated)        GTT Fasting        GTT 1 Hr        GTT 2 Hr        GTT 3 Hr        Group B Strep              Drug Screening     Test Value Reference Range Date Time    Amphetamine Screen        Barbiturate Screen        Benzodiazepine Screen        Methadone Screen        Phencyclidine Screen        Opiates Screen        THC Screen        Cocaine Screen        Propoxyphene Screen        Buprenorphine Screen        Methamphetamine Screen        Oxycodone Screen        Tricyclic Antidepressants Screen              Other (Risk screening)     Test Value Reference Range Date Time    Varicella IgG        Parvovirus IgG        CMV IgG        Cystic Fibrosis        Hemoglobin electrophoresis        NIPT        MSAFP-4        AFP (for NTD only)                  External Prenatal Results    The patient does not have a working HORTENCIA. Some results will not display without a working HORTENCIA.   Pregnancy Outside Results - Transcribed From Office Records - See Scanned Records For Details     Test Value Date Time    Hgb       Hct       ABO B  09/30/20 1343    Rh Positive  09/30/20 1343    Antibody Screen       Glucose Fasting GTT       Glucose Tolerance Test 1 hour       Glucose Tolerance Test 3 hour       Gonorrhea (discrete)       Chlamydia (discrete)       RPR       VDRL       Syphilis Antibody       Rubella       HBsAg       Herpes Simplex Virus PCR       Herpes Simplex VIrus Culture       HIV       Hep C RNA Quant PCR       Hep C Antibody       AFP       Group B Strep       GBS Susceptibility to Clindamycin       GBS Susceptibility to Erythromycin       Fetal Fibronectin       Genetic Testing, Maternal Blood             Drug Screening     Test Value Date Time    Urine Drug Screen       Amphetamine Screen       Barbiturate Screen       Benzodiazepine Screen       Methadone Screen       Phencyclidine Screen       Opiates Screen       THC Screen       Cocaine Screen       Propoxyphene Screen       Buprenorphine Screen        Methamphetamine Screen       Oxycodone Screen       Tricyclic Antidepressants Screen                    Past OB History:     OB History    Para Term  AB Living   5 3 3 0 1 3   SAB TAB Ectopic Molar Multiple Live Births   1 0 0 0 0 3      # Outcome Date GA Lbr Ta/2nd Weight Sex Delivery Anes PTL Lv   5 Current            4 SAB 19 7w0d          3 Term 19 40w2d 04:24 / 00:19 3320 g (7 lb 5.1 oz) F Vag-Spont EPI N JUAN RAMON      Name: TRENTON MIRANDAMARIBEL      Apgar1: 9  Apgar5: 9   2 Term 10/05/17 40w0d 19:12 / 00:15 3402 g (7 lb 8 oz) F Vag-Spont EPI N JUAN RAMON      Name: DENNY MIRANDA      Apgar1: 8  Apgar5: 9   1 Term  40w0d  3062 g (6 lb 12 oz) M Vag-Spont   JUAN RAMON        ALLERGIES:   No Known Allergies     Home Medications:     Prior to Admission medications    Medication Sig Start Date End Date Taking? Authorizing Provider   ferrous sulfate 325 (65 FE) MG tablet Take 1 tablet by mouth 3 (Three) Times a Day With Meals. 20   Flora Cabrera APRN       Past Medical History: Past Medical History:   Diagnosis Date   • Acute pharyngitis    • Asthma    • Encounter for initial prescription of contraceptive pills    • Fever    • GERD (gastroesophageal reflux disease)    • History of MRSA infection    • Nausea       Past Surgical History Past Surgical History:   Procedure Laterality Date   • ADENOIDECTOMY     • TONSILLECTOMY     • TONSILLECTOMY AND ADENOIDECTOMY     • TYMPANOSTOMY        Family History: Family History   Problem Relation Age of Onset   • Lung cancer Paternal Grandfather    • Breast cancer Neg Hx    • Uterine cancer Neg Hx    • Ovarian cancer Neg Hx    • Colon cancer Neg Hx       Social History:  reports that she has been smoking cigarettes. She has been smoking about 1.00 pack per day. She has never used smokeless tobacco.   reports no history of alcohol use.   reports no history of drug use.        Review of Systems   Constitutional: Negative for chills,  fatigue and fever.   HENT: Negative for sore throat.    Eyes: Negative for visual disturbance.   Respiratory: Negative for cough, shortness of breath and wheezing.    Cardiovascular: Negative for chest pain, palpitations and leg swelling.   Gastrointestinal: Negative for abdominal pain, diarrhea, nausea and vomiting.   Genitourinary: Negative for dysuria, flank pain, frequency, vaginal bleeding, vaginal discharge and vaginal pain.   Neurological: Negative for syncope, light-headedness and headaches.   Psychiatric/Behavioral: Negative for dysphoric mood and suicidal ideas. The patient is not nervous/anxious.                                                                                                                          Objective     There were no vitals filed for this visit.    OBGyn Exam  Constitutional: Appears to be in no acute distress; Eyes: sclera normal; Endocrine system: thyroid palpate is normal; Pulmonary system: lungs clear; Cardiovascular system: heart regular rate and rhythm; Gastrointestinal system: abdomen soft nontender, active bowel sounds; Urologic system: CVA negative; Psychiatric: appropriate insight; Neurologic: gait within normal limits cervix 5 cm, reassuring NST on both babies.  Breech presentation on ultrasound with both fetuses.      Last Labs  Lab Results   Component Value Date    WBC 11.07 (H) 09/30/2020    RBC 3.44 (L) 09/30/2020    HGB 10.2 (L) 09/30/2020    HCT 30.0 (L) 09/30/2020    MCV 87.2 09/30/2020    MCH 29.7 09/30/2020    MCHC 34.0 09/30/2020    RDW 12.8 09/30/2020    RDWSD 39.9 09/30/2020    MPV 10.8 09/30/2020     09/30/2020        Lab Results   Component Value Date    GLUCOSE 81 10/05/2017    BUN 5 (L) 10/05/2017    CREATININE 0.58 10/05/2017     (L) 10/05/2017    K 3.9 10/05/2017     10/05/2017    CO2 21.0 (L) 10/05/2017    CALCIUM 9.3 10/05/2017    PROTEINTOT 5.4 (L) 10/05/2017    ALBUMIN 2.90 (L) 10/05/2017    ALT 14 10/05/2017    AST 15  10/05/2017    ALKPHOS 229 (H) 10/05/2017    BILITOT 0.4 10/05/2017    EGFRIFNONA 135 10/05/2017    GLOB 2.5 10/05/2017    AGRATIO 1.2 10/05/2017    BCR 8.6 10/05/2017    ANIONGAP 6.0 10/05/2017       Lab Results   Component Value Date    HCGQUAL POSITIVE 2019         Assessment/Plan:  1. 20 y.o. [id number].  At approximately 36 weeks with di-ditwin gestation.  And now in  labor.  Both fetuses in breech presentation.  Recommend patient undergo primary low transverse  section due to breech presentation.  Patient understands while she is nervous does consent to proceed with surgery.  Patient does want tubal done at the same time.  Plan routine operative care and routine postoperative care.  Infants will likely need to go to the NICU for evaluation.      Shanika Hudson and I have discussed pain goals for this hospitalization after reviewing her current clinical condition, medical history and prior pain experiences.  The goal is to keep her pain level tolerable.  To help achieve this, I plan to IV and oral pain medication after surgery.           This document has been electronically signed by Salvador Godfrey DO on 2020 08:11 CST

## 2020-12-13 NOTE — OP NOTE
Section Procedure Note    Shanika Chappellog Becca    2021     Indications: Di-ditwin gestation and  labor with both fetuses in the breech presentation.    Pre-operative Diagnosis: Di-ditwin gestation with insufficient prenatal care  labor in breech presentation    Post-operative Diagnosis: Same as above    PROCEDURES PERFORMED: Procedure(s):   SECTION PRIMARY WITH TUBAL LIGATION    SURGEON: Salvador Godfrey DO, FACOG    Assistant: Shanti Nunez CSA was responsible for performing the following activities: Retraction, Suction, Irrigation, Closing, Placing Dressing and Delivery of Fetus and their skilled assistance was necessary for the success of this case.     STAFF:   Scrub Person: Sylvia Trimble  L & D Nurse: Millie Thomas RN; Josefina Jenkins RN; Nette Carmona RN  Assistant: Shanti Nunez CSA    ANESTHESIA: Spinal    ANESTHESIA STAFF:  CRNA: Primo Dhillon CRNA    Findings: Normal-appearing uterus tubes and ovaries.  Baby A in the complete breech presentation male.  Baby B in the footling breech presentation female.       Lakisha Hudson [[id number]]     Birth Information  YOB: 2021   Time of birth: 9:13 AM   Delivering clinician: Salvador Godfrey   Sex: male   Delivery type: , Low Transverse   Breech type (if applicable):     Observed anomalies/comments:          Tammy Hudson [[id number]]     Birth Information  YOB: 2021   Time of birth: 9:15 AM   Delivering clinician: Salvador Godfrey   Sex: female   Delivery type: , Low Transverse   Breech type (if applicable):     Observed anomalies/comments:         Estimated Blood Loss:  800           Drains: Hawkins                 Specimens: Placenta and Tubal segements               Complications:  None; patient tolerated the procedure well.           Disposition: Mother Baby Unit           Condition: stable    Procedure  Details   After obtaining informed consent, the patient was taken to the operating room where epidural anesthesia was found to be adequate. Preoperative antibiotics were given.  A Hawkins catheter and bilateral sequential compression devices were placed.  She was then prepped and draped in the normal sterile fashion in the dorsal supine position with a left lateral tilt. A final time out was performed. A Pfannenstiel skin incision was then made with the scalpel and carried through to the underlying layer of fascia.  The fascia was incised in the midline and the incision extended laterally with traction.  The rectus muscles were then  in the midline, and the peritoneum was entered digitally.   The peritoneal incision was then extended superiorly and inferiorly with good visualization of the bladder. Dg retractor was placed without difficulty.    The lower uterine segment scored in a transverse fashion with the scalpel.  The uterus was then entered bluntly and the incision extended w/ traction.  The buttocks of baby a was elevated to the level of the incision.  The legs were delivered then using Pinard's maneuver.  Arms were delivered using left sets maneuver and the head delivered spontaneously without difficulty.  Amniotic sac on baby B was then ruptured.  Both feet of baby B were then delivered without difficulty.  Baby was delivered to the level to fall at the scapulas.  Baby was wrapped in a blue towel and arms were delivered using left sets maneuver.  Head again delivered atraumatically without difficulty.  Both infants were taken to the warmer for care.    The placenta was then removed manually, the uterus exteriorized, and cleared of all clots and debris.  The uterine incision was repaired with 0 Vicryl in a running, locking fashion.  An imbricating layer was then performed with #1 chromic.  The uterine incision was inspected and hemostasis was noted.  Attention was turned to the fallopian tubes.   The left mesosalpinx was identified cauterized and transected using the Enseal device to the level of the cornua the tube was then cauterized and transected using the Enseal device and amputated from the uterus the tube was passed off the operative field.  The same procedure was then performed on the right.  Posterior cul-de-sac was suctioned and uterus returned to the abdomen.  The gutters were cleared of all clots with excellent hemostasis noted.  The fascia was reapproximated with 0 Vicryl in a running fashion. The skin was closed using 4-0 Monocryl suture.    The patient tolerated the procedure well.  Sponge, lap, and needle counts were correct times two. Vaginal sweep was completed.  The patient was taken to the recovery room in stable condition.      This document has been electronically signed by Salvador Godfrey DO on December 13, 2020 10:05 CST

## 2020-12-13 NOTE — ANESTHESIA POSTPROCEDURE EVALUATION
Patient: Shanika Hudson    Procedure Summary     Date: 20 Room / Location: [facility] LABOR DELIVERY 1 / [facility] LABOR DELIVERY    Anesthesia Start: 0848 Anesthesia Stop:     Procedure:  SECTION PRIMARY WITH TUBAL LIGATION (N/A Abdomen) Diagnosis:     Surgeon: Salvador Godfrey DO Provider: Primo Dhillon CRNA    Anesthesia Type: spinal ASA Status: 4 - Emergent          Anesthesia Type: spinal    Vitals  No vitals data found for the desired time range.          Post Anesthesia Care and Evaluation    Patient location during evaluation: bedside  Patient participation: complete - patient cannot participate  Level of consciousness: awake  Pain score: 0  Pain management: adequate  Airway patency: patent  Anesthetic complications: No anesthetic complications  PONV Status: none  Cardiovascular status: acceptable  Respiratory status: acceptable  Hydration status: acceptable

## 2020-12-13 NOTE — ANESTHESIA PREPROCEDURE EVALUATION
Anesthesia Evaluation     NPO Solid Status: > 4 hours  NPO Liquid Status: > 4 hours           Airway   Mallampati: II  TM distance: >3 FB  Neck ROM: full  Possible difficult intubation  Dental - normal exam     Pulmonary - normal exam   (+) asthma,  Cardiovascular - normal exam        Neuro/Psych  GI/Hepatic/Renal/Endo    (+)  GERD,      Musculoskeletal     Abdominal    Substance History      OB/GYN    (+) Pregnant,         Other                        Anesthesia Plan    ASA 4 - emergent     spinal       Anesthetic plan, all risks, benefits, and alternatives have been provided, discussed and informed consent has been obtained with: patient.

## 2020-12-13 NOTE — L&D DELIVERY NOTE
Patient underwent primary low transverse  section for  labor with di-ditwins both in the breech presentation.  Please see op note for full details.

## 2020-12-13 NOTE — NURSING NOTE
Dr. Godfrey notified of patients positive antibody screen, Anti S. B positive blood type.     No new orders received. MD suggested RN let NICU know for the care of her twins.         1220 GWatts RN notified of Anti S from positive antibody screen

## 2020-12-14 LAB
BASOPHILS # BLD AUTO: 0.05 10*3/MM3 (ref 0–0.2)
BASOPHILS NFR BLD AUTO: 0.2 % (ref 0–1.5)
DEPRECATED RDW RBC AUTO: 41.3 FL (ref 37–54)
EOSINOPHIL # BLD AUTO: 0.11 10*3/MM3 (ref 0–0.4)
EOSINOPHIL NFR BLD AUTO: 0.5 % (ref 0.3–6.2)
ERYTHROCYTE [DISTWIDTH] IN BLOOD BY AUTOMATED COUNT: 14.6 % (ref 12.3–15.4)
HCT VFR BLD AUTO: 25 % (ref 34–46.6)
HGB BLD-MCNC: 7.8 G/DL (ref 12–15.9)
IMM GRANULOCYTES # BLD AUTO: 0.19 10*3/MM3 (ref 0–0.05)
IMM GRANULOCYTES NFR BLD AUTO: 0.9 % (ref 0–0.5)
LYMPHOCYTES # BLD AUTO: 3.16 10*3/MM3 (ref 0.7–3.1)
LYMPHOCYTES NFR BLD AUTO: 15.8 % (ref 19.6–45.3)
MCH RBC QN AUTO: 24.5 PG (ref 26.6–33)
MCHC RBC AUTO-ENTMCNC: 31.2 G/DL (ref 31.5–35.7)
MCV RBC AUTO: 78.6 FL (ref 79–97)
MONOCYTES # BLD AUTO: 0.84 10*3/MM3 (ref 0.1–0.9)
MONOCYTES NFR BLD AUTO: 4.2 % (ref 5–12)
NEUTROPHILS NFR BLD AUTO: 15.68 10*3/MM3 (ref 1.7–7)
NEUTROPHILS NFR BLD AUTO: 78.4 % (ref 42.7–76)
NRBC BLD AUTO-RTO: 0.3 /100 WBC (ref 0–0.2)
PLATELET # BLD AUTO: 262 10*3/MM3 (ref 140–450)
PMV BLD AUTO: 11.1 FL (ref 6–12)
RBC # BLD AUTO: 3.18 10*6/MM3 (ref 3.77–5.28)
WBC # BLD AUTO: 20.03 10*3/MM3 (ref 3.4–10.8)

## 2020-12-14 PROCEDURE — 85025 COMPLETE CBC W/AUTO DIFF WBC: CPT | Performed by: OBSTETRICS & GYNECOLOGY

## 2020-12-14 PROCEDURE — 25010000002 CEFAZOLIN PER 500 MG: Performed by: OBSTETRICS & GYNECOLOGY

## 2020-12-14 PROCEDURE — 0503F POSTPARTUM CARE VISIT: CPT | Performed by: OBSTETRICS & GYNECOLOGY

## 2020-12-14 PROCEDURE — 25010000002 KETOROLAC TROMETHAMINE PER 15 MG: Performed by: OBSTETRICS & GYNECOLOGY

## 2020-12-14 PROCEDURE — 94799 UNLISTED PULMONARY SVC/PX: CPT

## 2020-12-14 RX ADMIN — KETOROLAC TROMETHAMINE 30 MG: 30 INJECTION, SOLUTION INTRAMUSCULAR; INTRAVENOUS at 01:00

## 2020-12-14 RX ADMIN — KETOROLAC TROMETHAMINE 30 MG: 30 INJECTION, SOLUTION INTRAMUSCULAR; INTRAVENOUS at 08:42

## 2020-12-14 RX ADMIN — ACETAMINOPHEN 1000 MG: 500 TABLET ORAL at 21:38

## 2020-12-14 RX ADMIN — PRENATAL VIT W/ FE FUMARATE-FA TAB 27-0.8 MG 1 TABLET: 27-0.8 TAB at 08:42

## 2020-12-14 RX ADMIN — IBUPROFEN 800 MG: 800 TABLET, FILM COATED ORAL at 16:18

## 2020-12-14 RX ADMIN — IBUPROFEN 800 MG: 800 TABLET, FILM COATED ORAL at 21:37

## 2020-12-14 RX ADMIN — ACETAMINOPHEN 1000 MG: 500 TABLET ORAL at 14:00

## 2020-12-14 RX ADMIN — ACETAMINOPHEN 1000 MG: 500 TABLET ORAL at 01:00

## 2020-12-14 RX ADMIN — CEFAZOLIN SODIUM 2 G: 10 INJECTION, POWDER, FOR SOLUTION INTRAVENOUS at 01:00

## 2020-12-14 NOTE — PROGRESS NOTES
[facility] Jeffery Hudson  : 2000  MRN: [id number]  CSN: [id number]    Postpartum Day #1  Subjective   Patient is status post primary low transverse  section on postoperative day #1.  She is overall doing well.  Pain is well controlled.  She is ambulated and has urinated.  Is tolerating a regular diet.  Denies any fevers at this time.  Mood and affect seen depressed although this may be baseline for patient will continue to monitor closely.     Objective     Min/max vitals past 24 hours:   Temp  Min: 97.9 °F (36.6 °C)  Max: 98.9 °F (37.2 °C)  BP  Min: 103/70  Max: 141/64  Pulse  Min: 76  Max: 110  Pulse  Min: 76  Max: 110        Abdomen: soft, nontender, bowel sounds normal, no masses   fundus firm and nontender   Calves: Nontender, no cords palpable   Pelvic: deferred     Lab Results   Component Value Date    WBC 20.03 (H) 2020    HGB 7.8 (L) 2020    HCT 25.0 (L) 2020    MCV 78.6 (L) 2020     2020    ABORH B POSITIVE 2019    RH Positive 2020    HEPBSAG Non-Reactive 2020        Assessment   1. Postpartum Day #1 S/P primary low transverse  section, doing well and recovering appropriately.  Appropriate drop in H&H and vitals are stable.     Plan   1. Continue routine postpartum care  2. Continue routine postoperative care  3. Encourage ambulation  4. Regular diet  5. Close eye to mood  6. Continue inpatient management at this time.          This document has been electronically signed by Salvador Godfrey DO on 2020 05:23 CST

## 2020-12-14 NOTE — PLAN OF CARE
Problem: Adult Inpatient Plan of Care  Goal: Plan of Care Review  Outcome: Ongoing, Progressing  Flowsheets (Taken 2020 1327 by Marcy Monique RN)  Progress: improving  Plan of Care Reviewed With: patient  Outcome Summary: VSS, tolerating clear liquids, pain controlled, FF @ -1, lochia light  Goal: Patient-Specific Goal (Individualized)  Outcome: Ongoing, Progressing  Goal: Absence of Hospital-Acquired Illness or Injury  Outcome: Ongoing, Progressing  Intervention: Identify and Manage Fall Risk  Recent Flowsheet Documentation  Taken 2020 1533 by Tran Roberts RN  Safety Promotion/Fall Prevention:   safety round/check completed   clutter free environment maintained   assistive device/personal items within reach  Taken 2020 1420 by Tran Roberts RN  Safety Promotion/Fall Prevention: patient off unit  Goal: Optimal Comfort and Wellbeing  Outcome: Ongoing, Progressing  Intervention: Provide Person-Centered Care  Recent Flowsheet Documentation  Taken 2020 1533 by Tran Roberts RN  Trust Relationship/Rapport:   care explained   questions encouraged   questions answered   choices provided  Goal: Readiness for Transition of Care  Outcome: Ongoing, Progressing     Problem: Adjustment to Role Transition (Postpartum  Delivery)  Goal: Successful Maternal Role Transition  Outcome: Ongoing, Progressing  Intervention: Support Maternal Role Transition  Recent Flowsheet Documentation  Taken 2020 1533 by Tran Roberts RN  Supportive Measures: active listening utilized     Problem: Bleeding (Postpartum  Delivery)  Goal: Hemostasis  Outcome: Ongoing, Progressing     Problem: Infection (Postpartum  Delivery)  Goal: Absence of Infection Signs and Symptoms  Outcome: Ongoing, Progressing     Problem: Pain (Postpartum  Delivery)  Goal: Acceptable Pain Control  Outcome: Ongoing, Progressing     Problem: Postoperative Nausea and Vomiting  (Postpartum  Delivery)  Goal: Nausea and Vomiting Relief  Outcome: Ongoing, Progressing     Problem: Postoperative Urinary Retention (Postpartum  Delivery)  Goal: Effective Urinary Elimination  Outcome: Ongoing, Progressing     Problem: Device-Related Complication Risk (Anesthesia/Analgesia, Neuraxial)  Goal: Safe Infusion Delivery Completion  Outcome: Ongoing, Progressing     Problem: Infection (Anesthesia/Analgesia, Neuraxial)  Goal: Absence of Infection Signs and Symptoms  Outcome: Ongoing, Progressing     Problem: Nausea and Vomiting (Anesthesia/Analgesia, Neuraxial)  Goal: Nausea and Vomiting Relief  Outcome: Ongoing, Progressing     Problem: Pain (Anesthesia/Analgesia, Neuraxial)  Goal: Effective Pain Control  Outcome: Ongoing, Progressing     Problem: Respiratory Compromise (Anesthesia/Analgesia, Neuraxial)  Goal: Effective Oxygenation and Ventilation  Outcome: Ongoing, Progressing     Problem: Sensorimotor Impairment (Anesthesia/Analgesia, Neuraxial)  Goal: Baseline Motor Function  Outcome: Ongoing, Progressing  Intervention: Optimize Sensorimotor Function  Recent Flowsheet Documentation  Taken 2020 1533 by Tran Roberts RN  Safety Promotion/Fall Prevention:   safety round/check completed   clutter free environment maintained   assistive device/personal items within reach  Taken 2020 1420 by Tran Roberts RN  Safety Promotion/Fall Prevention: patient off unit     Problem: Urinary Retention (Anesthesia/Analgesia, Neuraxial)  Goal: Effective Urinary Elimination  Outcome: Ongoing, Progressing     Problem: Fall Injury Risk  Goal: Absence of Fall and Fall-Related Injury  Outcome: Ongoing, Progressing  Intervention: Promote Injury-Free Environment  Recent Flowsheet Documentation  Taken 2020 1533 by Tran Roberts RN  Safety Promotion/Fall Prevention:   safety round/check completed   clutter free environment maintained   assistive device/personal items within  reach  Taken 12/13/2020 1420 by Tran Roberts, RN  Safety Promotion/Fall Prevention: patient off unit   Goal Outcome Evaluation:  Plan of Care Reviewed With: patient  Progress: improving

## 2020-12-14 NOTE — PLAN OF CARE
Goal Outcome Evaluation:  Plan of Care Reviewed With: patient  Progress: improving  Outcome Summary: VSS; voids, ambulates in room, pain controlled with scheduled meds.  fundus firm, lochia light. patient slept most of the day.  went to see babies 1 time on shift.

## 2020-12-14 NOTE — PLAN OF CARE
Goal Outcome Evaluation:  Plan of Care Reviewed With: patient  Progress: improving  Outcome Summary: vss, voids (per pt report), ambulates in room, pain controlled with scheduled meds, lochia small, visited babies at beginning of shift, case management consult in for +uds

## 2020-12-15 PROCEDURE — 94799 UNLISTED PULMONARY SVC/PX: CPT

## 2020-12-15 RX ADMIN — OXYCODONE HYDROCHLORIDE 10 MG: 5 TABLET ORAL at 01:35

## 2020-12-15 RX ADMIN — IBUPROFEN 800 MG: 800 TABLET, FILM COATED ORAL at 15:56

## 2020-12-15 RX ADMIN — ACETAMINOPHEN 1000 MG: 500 TABLET ORAL at 21:35

## 2020-12-15 RX ADMIN — IBUPROFEN 800 MG: 800 TABLET, FILM COATED ORAL at 06:04

## 2020-12-15 RX ADMIN — ACETAMINOPHEN 1000 MG: 500 TABLET ORAL at 06:04

## 2020-12-15 RX ADMIN — IBUPROFEN 800 MG: 800 TABLET, FILM COATED ORAL at 21:35

## 2020-12-15 RX ADMIN — PRENATAL VIT W/ FE FUMARATE-FA TAB 27-0.8 MG 1 TABLET: 27-0.8 TAB at 08:41

## 2020-12-15 RX ADMIN — ACETAMINOPHEN 1000 MG: 500 TABLET ORAL at 15:57

## 2020-12-15 NOTE — PROGRESS NOTES
[facility] Jeffery Hudson  : 2000  MRN: [id number]  CSN: [id number]    Postpartum Day #2  Subjective   The patient has no complaints, she is ambulating without difficulty, tolerating a regular diet.  Denies severe pain at this time.     Objective     Min/max vitals past 24 hours:   Temp  Min: 96.7 °F (35.9 °C)  Max: 98.2 °F (36.8 °C)  BP  Min: 104/60  Max: 116/73  Pulse  Min: 78  Max: 100  Pulse  Min: 78  Max: 100        Abdomen: soft, nontender, bowel sounds normal, no masses   fundus firm and nontender, incision clean dry and intact no erythema or drainage   Calves: Nontender, no cords palpable   Pelvic: deferred     Lab Results   Component Value Date    WBC 20.03 (H) 2020    HGB 7.8 (L) 2020    HCT 25.0 (L) 2020    MCV 78.6 (L) 2020     2020    ABORH B POSITIVE 2019    RH Positive 2020    HEPBSAG Non-Reactive 2020        Assessment   1. Postpartum Day #2 S/P primary low transverse  section with bilateral salpingectomy, doing well and recovering appropriately at this time.  Vital signs are stable.     Plan   1. Continue routine postpartum care  2. Continue inpatient care at this time  3. Continue routine postoperative care  4. Encourage ambulation  5. Regular diet          This document has been electronically signed by Salvador Godfrey DO on December 15, 2020 07:20 CST

## 2020-12-16 VITALS
HEART RATE: 93 BPM | OXYGEN SATURATION: 99 % | DIASTOLIC BLOOD PRESSURE: 75 MMHG | SYSTOLIC BLOOD PRESSURE: 109 MMHG | RESPIRATION RATE: 16 BRPM | TEMPERATURE: 98.2 F

## 2020-12-16 PROBLEM — O32.9XX0 MALPRESENTATION BEFORE ONSET OF LABOR: Status: RESOLVED | Noted: 2020-12-13 | Resolved: 2020-12-16

## 2020-12-16 PROBLEM — O99.330 TOBACCO USE DURING PREGNANCY, ANTEPARTUM: Status: RESOLVED | Noted: 2020-09-30 | Resolved: 2020-12-16

## 2020-12-16 PROBLEM — O09.40 ENCOUNTER FOR SUPERVISION OF HIGH RISK PREGNANCY WITH GRAND MULTIPARITY, ANTEPARTUM: Status: RESOLVED | Noted: 2020-09-30 | Resolved: 2020-12-16

## 2020-12-16 PROBLEM — O09.30 INSUFFICIENT ANTEPARTUM CARE: Status: RESOLVED | Noted: 2020-09-30 | Resolved: 2020-12-16

## 2020-12-16 LAB — AMPHET+METHAMPHET UR QL: POSITIVE

## 2020-12-16 PROCEDURE — 0503F POSTPARTUM CARE VISIT: CPT | Performed by: OBSTETRICS & GYNECOLOGY

## 2020-12-16 RX ORDER — IBUPROFEN 800 MG/1
800 TABLET ORAL EVERY 8 HOURS SCHEDULED
Qty: 60 TABLET | Refills: 2 | Status: SHIPPED | OUTPATIENT
Start: 2020-12-16

## 2020-12-16 RX ORDER — OXYCODONE AND ACETAMINOPHEN 7.5; 325 MG/1; MG/1
1 TABLET ORAL EVERY 6 HOURS PRN
Qty: 18 TABLET | Refills: 0 | Status: SHIPPED | OUTPATIENT
Start: 2020-12-16

## 2020-12-16 RX ADMIN — ACETAMINOPHEN 1000 MG: 500 TABLET ORAL at 05:31

## 2020-12-16 RX ADMIN — IBUPROFEN 800 MG: 800 TABLET, FILM COATED ORAL at 05:29

## 2020-12-16 RX ADMIN — PRENATAL VIT W/ FE FUMARATE-FA TAB 27-0.8 MG 1 TABLET: 27-0.8 TAB at 08:52

## 2020-12-16 NOTE — DISCHARGE SUMMARY
[facility] - Discharge Summary from  Section     Patient Name: Shanika Hudson  MRN: [id number]  : 2000  Admit Date: 2020  Discharge Date: 2020  Admitting Physician: Salvador Godfrey, DO  Discharge Physician: Silvio Kim MD     Admission/Discharge diagnoses:  Active Hospital Problems    Diagnosis  POA   • Status post primary low transverse  section [Z98.891]  Not Applicable      Resolved Hospital Problems    Diagnosis Date Resolved POA   • Malpresentation before onset of labor [O32.9XX0] 2020 Yes     Hospital Course:  Shanika Hudson is a  who was admitted on 2020 for  labor with di-ditwins in breech position. She expressed desire for discharge on POD #3 Days Post-Op.     Procedures Performed:  SECTION PRIMARY WITH TUBAL LIGATION. Indication for C section:  labor with di-ditwins in breech position.     History:  The patient had a male and female  on 20 at 0913 and 0915 respectively. She delivered male infant weighing 2270 g, apgar 8 and 9 at one and five minutes respectively. She deliveryed a female infant weighing 2020 g, apgars 7 and 9 at one and five minutes respectively. Estimated blood loss 800 mL.     Subjective:  The patient feels well. Pain is well controlled with current medications. The baby is well. The patient is ambulating well. The patient has tolerating a normal diet.  Lochia appropriate. Flatus has been passed.      Objective:  Vitals:    20 0531   BP: 115/80   Pulse: 97   Resp: 18   Temp: 97.8 °F (36.6 °C)   SpO2: 100%      General: alert, fatigue appearing   CVS: RRR  RESP: CTAB, normal effort  ABD: soft, normal bowel sounds  Uterine Fundus: soft  Incision: healing well, no significant drainage, no dehiscence, no significant erythema  Dressing: bilateral  Ext: good ROM and strength, no edema     Labs at Discharge:  Lab Results   Component Value Date     WBC 20.03 (H) 12/14/2020    HGB 7.8 (L) 12/14/2020    HCT 25.0 (L) 12/14/2020    MCV 78.6 (L) 12/14/2020     12/14/2020     Disposition: Home  Discharge condition: stable     Discharge Diet:   Diet Instructions     Diet: Regular      Discharge Diet: Regular        Activity:  Activity Instructions     Bathing Restrictions      Type of Restriction: Bathing    Bathing Restrictions: Other    Explain Bathing Restrictions: No soaking in bathtub for 4 weeks, showers are fine.    Driving Restrictions      Type of Restriction: Driving    Driving Restrictions: No Driving (Time Limited)    Length: Other    Indicate Length of Restriction: No driving for 1 week or while on narcotic pain medications. Riding is car is fine.    Lifting Restrictions      Type of Restriction: Lifting    Lifting Restrictions: Other    Explain Lifing Restrictions: No lifting more than infant and baby carrier together for 6 weeks.    Pelvic Rest      Nothing in the vagina for 6 weeks to include tampons or intercourse.    Sexual Activity Restrictions      Type of Restriction: Sex    Explain Sexual Activity Restrictions: No sexual intercourse for at least 6 weeks        Call MD if you experience heavy vaginal bleeding, frequent passage of clots, foul odor of lochia, increased pain, fever > 100.4F or any other concerns.    Discharge Meds:     Your medication list      START taking these medications      Instructions Last Dose Given Next Dose Due   ibuprofen 800 MG tablet  Commonly known as: ADVIL,MOTRIN      Take 1 tablet by mouth Every 8 (Eight) Hours.       oxyCODONE-acetaminophen 7.5-325 MG per tablet  Commonly known as: Percocet      Take 1 tablet by mouth Every 6 (Six) Hours As Needed for Moderate Pain .             Where to Get Your Medications      These medications were sent to [facility] - [place] [id number]    Hours: Monday through Friday 7:00am to 5:00pm Phone: [phone]   · ibuprofen 800 MG  tablet  · oxyCODONE-acetaminophen 7.5-325 MG per tablet       Follow Up:  Kamryn Moser, APRN  [address] [facility]  [place] [address] [phone]          Signature    This document has been electronically signed by Silvio Kim MD on December 16, 2020 07:28 CST

## 2020-12-16 NOTE — PROGRESS NOTES
[facility] Post  Progress Note  Hospital Day: 3  Subjective:  Postop day: 3 Days Post-Op  The patient feels well. Pain is well controlled with current medications. The baby is well. The patient is ambulating well. The patient is tolerating a normal diet. Flatus has been passed.  Lochia appropriate.    Meds Reviewed  ROS reviewed. All other ROS other than mentioned above are reported as negative.  Surgical history updated.  Prenatal Labs:  Lab Results   Component Value Date    RUBELLAABIGG Positive 2020    ABO B 2020    RH Positive 2020     Objective:  Vitals:    12/15/20 1408 12/15/20 1803 12/15/20 2138 20 0531   BP: 135/78 117/96 108/60 115/80   BP Location: Left arm Right arm Right arm Right arm   Patient Position: Sitting Sitting Lying Sitting   Pulse: 82 86 98 97   Resp: 18 18 20 18   Temp: 97.8 °F (36.6 °C) 98.6 °F (37 °C) 97 °F (36.1 °C) 97.8 °F (36.6 °C)   TempSrc: Oral Oral Oral Oral   SpO2: 100% 100% 100% 100%   General: alert, well appearing, in no apparent distress  CVS: RRR, S1/S2, no murmur  Lungs: clear to auscultation, no wheezes or rales and unlabored breathing  Bowel Sounds: active  Uterine Fundus: soft  Incision: healing well, no significant drainage, no dehiscence, no significant erythema  Dressing: C/D/I    Labs:  Lab Results   Component Value Date    WBC 20.03 (H) 2020    HGB 7.8 (L) 2020    HCT 25.0 (L) 2020    MCV 78.6 (L) 2020     2020    ABORH B POSITIVE 2019    RH Positive 2020    HEPBSAG Non-Reactive 2020     Assessment:   Active Hospital Problems    Diagnosis  POA   • Malpresentation before onset of labor [O32.9XX0]  Yes   • Status post primary low transverse  section [Z98.891]  Not Applicable      Resolved Hospital Problems   No resolved problems to display.     Plan:  20 y.o.  35w2d with Estimated Date of Delivery: 1/15/21 s/p      Em Carcamo [[id number]]    , Low Transverse      Sophia Carcamo [[id number]]   , Low Transverse    for  labor with di-ditwins both in breech presentation.       Em Carcamo [[id number]]   Male      Sophia Carcamo [[id number]]   Female    POD 3.     Doing well postoperatively.  Postoperative course complicated illicit drug use and social issues with loss of custody of multiple children with CPS involved.    Signature    This document has been electronically signed by Silvio Kim MD on 2020 07:02 CST

## 2020-12-17 LAB
LAB AP CASE REPORT: NORMAL
PATH REPORT.FINAL DX SPEC: NORMAL

## 2023-09-12 NOTE — DISCHARGE INSTR - ACTIVITY
Return or call provider for strong regular contractions which are 3-5 minutes apart after comfort measures.  Comfort measures include warm bath or shower, resting and drinking large glass of water.  Also return for leakage of fluids, vaginal bleeding or decreased fetal movements.  Drink plenty of water, keep next scheduled appointment and take all medications as prescribed.   
Detail Level: Detailed

## 2024-03-23 NOTE — ANESTHESIA PROCEDURE NOTES
Spinal Block      Patient location during procedure: OR  Start Time: 12/13/2020 8:50 AM  Stop Time: 12/13/2020 8:58 AM  Indication:at surgeon's request  Performed By  CRNA: Primo Dhillon CRNA  Preanesthetic Checklist  Completed: patient identified, site marked, surgical consent, pre-op evaluation, timeout performed, IV checked, risks and benefits discussed and monitors and equipment checked  Spinal Block Prep:  Patient Position:sitting  Sterile Tech:cap, gloves and sterile barriers  Prep:Betadine  Patient Monitoring:EKG, continuous pulse oximetry and blood pressure monitoring  Spinal Block Procedure  Approach:midline  Guidance:landmark technique  Location:L3-L4  Needle Type:Sprotte  Needle Gauge:22 G  Placement of Spinal needle event:cerebrospinal fluid aspirated  Paresthesia: no  Fluid Appearance:clear  Medications: bupivacaine PF (MARCAINE) 0.75 % injection, 1.6 mL  Med Administered at 12/13/2020 8:58 AM   Post Assessment  Patient Tolerance:patient tolerated the procedure well with no apparent complications  Complications no             yes yes

## (undated) DEVICE — TRY SPINE PENCAN 24GA X4IN

## (undated) DEVICE — GLV SURG SENSICARE W/ALOE PF LF 8.5 STRL

## (undated) DEVICE — GARMENT,MEDLINE,DVT,INT,CALF,MED, GEN2: Brand: MEDLINE

## (undated) DEVICE — SUT GUT PLN 0 STD TIE 54IN S104H

## (undated) DEVICE — ENSEAL TISSUE SEALER G2 CURVED JAW FOR USE WITH G2 GENERATOR 5MM DIAMETER 14CM SHAFT LENGTH: Brand: ENSEAL

## (undated) DEVICE — SYS SKIN CLS DERMABOND PRINEO W/22CM MESH TP

## (undated) DEVICE — GLV SURG TRIUMPH LT PF LTX 8 STRL

## (undated) DEVICE — SUT  GUT PLAIN 2/0 CT1 27IN 843H

## (undated) DEVICE — PK C/SECT 60

## (undated) DEVICE — SUT VIC 0 CTX 36IN J978H

## (undated) DEVICE — SUT GUT CHRM 2/0 SH 27IN G123H

## (undated) DEVICE — GOWN,PREVENTION PLUS,XLNG/XXLARGE,STRL: Brand: MEDLINE

## (undated) DEVICE — SUT GUT CHRM 1 CTX 36IN 905H

## (undated) DEVICE — SUT MNCRYL 3/0 Y936H